# Patient Record
Sex: FEMALE | Race: WHITE | NOT HISPANIC OR LATINO | URBAN - METROPOLITAN AREA
[De-identification: names, ages, dates, MRNs, and addresses within clinical notes are randomized per-mention and may not be internally consistent; named-entity substitution may affect disease eponyms.]

---

## 2021-08-09 ENCOUNTER — OUTPATIENT (OUTPATIENT)
Dept: OUTPATIENT SERVICES | Facility: HOSPITAL | Age: 86
LOS: 1 days | Discharge: HOME | End: 2021-08-09
Payer: MEDICARE

## 2021-08-09 DIAGNOSIS — M79.604 PAIN IN RIGHT LEG: ICD-10-CM

## 2021-08-09 PROCEDURE — 93971 EXTREMITY STUDY: CPT | Mod: 26,RT

## 2022-03-19 ENCOUNTER — EMERGENCY (EMERGENCY)
Facility: HOSPITAL | Age: 87
LOS: 0 days | Discharge: HOME | End: 2022-03-19
Attending: STUDENT IN AN ORGANIZED HEALTH CARE EDUCATION/TRAINING PROGRAM
Payer: MEDICARE

## 2022-03-19 VITALS
SYSTOLIC BLOOD PRESSURE: 179 MMHG | HEIGHT: 61 IN | WEIGHT: 134.92 LBS | DIASTOLIC BLOOD PRESSURE: 79 MMHG | HEART RATE: 84 BPM | RESPIRATION RATE: 20 BRPM | OXYGEN SATURATION: 97 % | TEMPERATURE: 98 F

## 2022-03-19 DIAGNOSIS — M79.89 OTHER SPECIFIED SOFT TISSUE DISORDERS: ICD-10-CM

## 2022-03-19 DIAGNOSIS — Z86.51 PERSONAL HISTORY OF COMBAT AND OPERATIONAL STRESS REACTION: ICD-10-CM

## 2022-03-19 DIAGNOSIS — F41.9 ANXIETY DISORDER, UNSPECIFIED: ICD-10-CM

## 2022-03-19 DIAGNOSIS — M79.662 PAIN IN LEFT LOWER LEG: ICD-10-CM

## 2022-03-19 DIAGNOSIS — E78.5 HYPERLIPIDEMIA, UNSPECIFIED: ICD-10-CM

## 2022-03-19 PROCEDURE — 73590 X-RAY EXAM OF LOWER LEG: CPT | Mod: 26,LT

## 2022-03-19 PROCEDURE — 99284 EMERGENCY DEPT VISIT MOD MDM: CPT

## 2022-03-19 PROCEDURE — 93971 EXTREMITY STUDY: CPT | Mod: 26

## 2022-03-19 RX ORDER — ACETAMINOPHEN 500 MG
975 TABLET ORAL ONCE
Refills: 0 | Status: COMPLETED | OUTPATIENT
Start: 2022-03-19 | End: 2022-03-19

## 2022-03-19 RX ADMIN — Medication 975 MILLIGRAM(S): at 16:11

## 2022-03-19 NOTE — ED PROVIDER NOTE - NSFOLLOWUPINSTRUCTIONS_ED_ALL_ED_FT
Leg Edema    WHAT YOU NEED TO KNOW:    Leg edema is swelling caused by fluid buildup. Your legs may swell if you sit or stand for long periods of time, are pregnant, or are injured. Swelling may also occur if you have heart failure or circulation problems. This means that your heart does not pump blood through your body as it should.    Lower Leg Edema         DISCHARGE INSTRUCTIONS:    Call your local emergency number (911 in the [place]) for any of the following:   •You cannot walk.      •You have chest pain or trouble breathing that is worse when you lie down.      •You suddenly feel lightheaded and have trouble breathing.      •You have new and sudden chest pain. You may have more pain when you take deep breaths or cough.      •You cough up blood.      Return to the emergency department if:   •You feel faint or confused.      •Your skin turns blue or gray.      •Your leg feels warm, tender, and painful. It may be swollen and red.      Call your doctor if:   •You have a fever or feel more tired than usual.      •The veins in your legs look larger than usual. They may look full or bulging.      •Your legs itch or feel heavy.      •You have red or white areas or sores on your legs. The skin may also appear dimpled or have indentations.      •You are gaining weight.      •You have trouble moving your ankles.      •The swelling does not go away, or other parts of your body swell.      •You have questions or concerns about your condition or care.      Self-care:   •Elevate your legs. Raise your legs above the level of your heart as often as you can. This will help decrease swelling and pain. Prop your legs on pillows or blankets to keep them elevated comfortably.  Elevate Leg           •Wear pressure stockings, if directed. These tight stockings put pressure on your legs to promote blood flow and prevent blood clots. Put them on before you get out of bed. Wear the stockings during the day. Do not wear them while you sleep.  Pressure Stockings            •Stay active. Do not stand or sit for long periods of time. Ask your healthcare provider about the best exercise plan for you.  Walking for Exercise           •Eat healthy foods. Healthy foods include fruits, vegetables, whole-grain breads, low-fat dairy products, beans, lean meats, and fish. Ask if you need to be on a special diet.  Healthy Foods           •Limit sodium (salt). Salt will make your body hold even more fluid. Your healthcare provider will tell you how many milligrams (mg) of salt you can have each day.             Follow up with your doctor as directed: Write down your questions so you remember to ask them during your visits.

## 2022-03-19 NOTE — ED PROVIDER NOTE - PATIENT PORTAL LINK FT
You can access the FollowMyHealth Patient Portal offered by [facility] by registering at the following website: [email or web address]. By joining [employer]’s FollowMyHealth portal, you will also be able to view your health information using other applications (apps) compatible with our system.

## 2022-03-19 NOTE — ED PROVIDER NOTE - NS ED ROS FT
CONST: no fevers, no chills, no trauma  EYES: no pain, no visual disturbances  ENT: no sore throat, no epistaxis, no rhinorrhea, no hearing changes  CV: no chest pain, no palpitations, no orthopnea, no extremity pain or swelling  RESP: no shortness of breath, no cough, no sputum, no pleurisy, no wheezing  ABD: no abdominal pain, no nausea, no vomiting, no diarrhea, no black or bloody stool  : no dysuria, no hematuria, no frequency, no urgency  MSK: left leg swelling.  NEURO: no headache, no sensory disturbances, no focal weakness, no dizziness  HEME: no easy bleeding or bruising  SKIN: no diaphoresis, no rash

## 2022-03-19 NOTE — ED PROVIDER NOTE - OBJECTIVE STATEMENT
86y F wP of HLD presents to the ED for left lateral leg swelling/pain for x2 days. Denies falls/trauma/injury, no noted rashes, fever/chills, CP, abdominal pain, recent travel/sick contact or dysuria. Pt states swelling is worse at the end of the day and better in the morning.

## 2022-03-19 NOTE — ED ADULT TRIAGE NOTE - NS ED TRIAGE AVPU SCALE
Alert-The patient is alert, awake and responds to voice. The patient is oriented to time, place, and person. The triage nurse is able to obtain subjective information.
hair removal not indicated

## 2022-03-19 NOTE — ED PROVIDER NOTE - PHYSICAL EXAMINATION
VITALS: reviewed  GEN: NAD, A & O x 4  HEAD/EYES: NCAT, PERRL, EOMI, anicteric sclerae, no conjunctival pallor  ENT: mucus membranes moist, oropharynx WNL, trachea midline, no JVD  RESP: lungs CTA with equal breath sounds bilaterally, chest wall nontender and atraumatic  CV: heart with reg rhythm S1, S2, no murmur; distal pulses intact and symmetric bilaterally  ABDOMEN: normoactive bowel sounds, soft, nondistended, nontender, no palpable masses  : no CVAT  MSK: tenderness of left lateral leg, Cecy's sign is negative, FROM of knee.   SKIN: warm, dry, no rash, no bruising, no cyanosis. color appropriate for ethnicity  NEURO: alert, mentating appropriately, no facial asymmetry. gross sensation, motor, coordination are intact  PSYCH: Affect appropriate VITALS: reviewed  GEN: NAD, A & O x 4  HEAD/EYES: NCAT, PERRL, EOMI, anicteric sclerae,   ENT: mucus membranes moist, oropharynx WNL, trachea midline,  RESP: lungs CTA with equal breath sounds bilaterally, chest wall nontender and atraumatic  CV: heart with reg rhythm S1, S2, no murmur; distal pulses intact and symmetric bilaterally  ABDOMEN: normoactive bowel sounds, soft, nondistended, nontender, no palpable masses  : no CVAT  MSK: tenderness of left lateral leg, Cecy's sign is negative, FROM of knee.   SKIN: warm, dry, no rash, no bruising, no cyanosis. color appropriate for ethnicity  NEURO: alert, mentating appropriately, no facial asymmetry. gross sensation, motor, coordination are intact  PSYCH: Affect appropriate

## 2022-06-22 ENCOUNTER — OUTPATIENT (OUTPATIENT)
Dept: OUTPATIENT SERVICES | Facility: HOSPITAL | Age: 87
LOS: 1 days | Discharge: HOME | End: 2022-06-22
Payer: MEDICARE

## 2022-06-22 ENCOUNTER — APPOINTMENT (OUTPATIENT)
Dept: ULTRASOUND IMAGING | Facility: HOSPITAL | Age: 87
End: 2022-06-22

## 2022-06-22 DIAGNOSIS — E83.10 DISORDER OF IRON METABOLISM, UNSPECIFIED: ICD-10-CM

## 2022-06-22 DIAGNOSIS — E83.119 HEMOCHROMATOSIS, UNSPECIFIED: ICD-10-CM

## 2022-06-22 PROCEDURE — 76705 ECHO EXAM OF ABDOMEN: CPT | Mod: 26

## 2022-08-30 ENCOUNTER — INPATIENT (INPATIENT)
Facility: HOSPITAL | Age: 87
LOS: 10 days | Discharge: HOME | End: 2022-09-10
Attending: INTERNAL MEDICINE | Admitting: INTERNAL MEDICINE

## 2022-08-30 VITALS
HEART RATE: 99 BPM | HEIGHT: 61 IN | OXYGEN SATURATION: 98 % | DIASTOLIC BLOOD PRESSURE: 77 MMHG | TEMPERATURE: 98 F | WEIGHT: 130.95 LBS | SYSTOLIC BLOOD PRESSURE: 134 MMHG | RESPIRATION RATE: 17 BRPM

## 2022-08-30 DIAGNOSIS — D64.9 ANEMIA, UNSPECIFIED: ICD-10-CM

## 2022-08-30 DIAGNOSIS — F41.9 ANXIETY DISORDER, UNSPECIFIED: ICD-10-CM

## 2022-08-30 DIAGNOSIS — Z85.51 PERSONAL HISTORY OF MALIGNANT NEOPLASM OF BLADDER: ICD-10-CM

## 2022-08-30 DIAGNOSIS — K63.89 OTHER SPECIFIED DISEASES OF INTESTINE: ICD-10-CM

## 2022-08-30 LAB
ABO RH CONFIRMATION: SIGNIFICANT CHANGE UP
ALBUMIN SERPL ELPH-MCNC: 3.1 G/DL — LOW (ref 3.3–5)
ALP SERPL-CCNC: 66 U/L — SIGNIFICANT CHANGE UP (ref 40–120)
ALT FLD-CCNC: 14 U/L — SIGNIFICANT CHANGE UP (ref 12–78)
ANION GAP SERPL CALC-SCNC: 6 MMOL/L — SIGNIFICANT CHANGE UP (ref 5–17)
ANISOCYTOSIS BLD QL: SLIGHT — SIGNIFICANT CHANGE UP
AST SERPL-CCNC: 19 U/L — SIGNIFICANT CHANGE UP (ref 15–37)
BASOPHILS # BLD AUTO: 0.06 K/UL — SIGNIFICANT CHANGE UP (ref 0–0.2)
BASOPHILS NFR BLD AUTO: 1.3 % — SIGNIFICANT CHANGE UP (ref 0–2)
BILIRUB SERPL-MCNC: 0.3 MG/DL — SIGNIFICANT CHANGE UP (ref 0.2–1.2)
BLD GP AB SCN SERPL QL: SIGNIFICANT CHANGE UP
BUN SERPL-MCNC: 19 MG/DL — SIGNIFICANT CHANGE UP (ref 7–23)
CALCIUM SERPL-MCNC: 9.1 MG/DL — SIGNIFICANT CHANGE UP (ref 8.5–10.1)
CHLORIDE SERPL-SCNC: 108 MMOL/L — SIGNIFICANT CHANGE UP (ref 96–108)
CO2 SERPL-SCNC: 25 MMOL/L — SIGNIFICANT CHANGE UP (ref 22–31)
CREAT SERPL-MCNC: 0.91 MG/DL — SIGNIFICANT CHANGE UP (ref 0.5–1.3)
EGFR: 62 ML/MIN/1.73M2 — SIGNIFICANT CHANGE UP
EOSINOPHIL # BLD AUTO: 0.08 K/UL — SIGNIFICANT CHANGE UP (ref 0–0.5)
EOSINOPHIL NFR BLD AUTO: 1.7 % — SIGNIFICANT CHANGE UP (ref 0–6)
FLUAV AG NPH QL: SIGNIFICANT CHANGE UP
FLUBV AG NPH QL: SIGNIFICANT CHANGE UP
GLUCOSE SERPL-MCNC: 92 MG/DL — SIGNIFICANT CHANGE UP (ref 70–99)
HCT VFR BLD CALC: 22.6 % — LOW (ref 34.5–45)
HGB BLD-MCNC: 6.2 G/DL — CRITICAL LOW (ref 11.5–15.5)
HYPOCHROMIA BLD QL: SLIGHT — SIGNIFICANT CHANGE UP
IMM GRANULOCYTES NFR BLD AUTO: 0.2 % — SIGNIFICANT CHANGE UP (ref 0–1.5)
LYMPHOCYTES # BLD AUTO: 0.93 K/UL — LOW (ref 1–3.3)
LYMPHOCYTES # BLD AUTO: 19.4 % — SIGNIFICANT CHANGE UP (ref 13–44)
MACROCYTES BLD QL: SLIGHT — SIGNIFICANT CHANGE UP
MANUAL SMEAR VERIFICATION: SIGNIFICANT CHANGE UP
MCHC RBC-ENTMCNC: 18.9 PG — LOW (ref 27–34)
MCHC RBC-ENTMCNC: 27.4 G/DL — LOW (ref 32–36)
MCV RBC AUTO: 68.9 FL — LOW (ref 80–100)
MICROCYTES BLD QL: SLIGHT — SIGNIFICANT CHANGE UP
MONOCYTES # BLD AUTO: 0.33 K/UL — SIGNIFICANT CHANGE UP (ref 0–0.9)
MONOCYTES NFR BLD AUTO: 6.9 % — SIGNIFICANT CHANGE UP (ref 2–14)
NEUTROPHILS # BLD AUTO: 3.38 K/UL — SIGNIFICANT CHANGE UP (ref 1.8–7.4)
NEUTROPHILS NFR BLD AUTO: 70.5 % — SIGNIFICANT CHANGE UP (ref 43–77)
NRBC # BLD: 0 /100 WBCS — SIGNIFICANT CHANGE UP (ref 0–0)
OVALOCYTES BLD QL SMEAR: SLIGHT — SIGNIFICANT CHANGE UP
PLAT MORPH BLD: NORMAL — SIGNIFICANT CHANGE UP
PLATELET # BLD AUTO: 428 K/UL — HIGH (ref 150–400)
POIKILOCYTOSIS BLD QL AUTO: SLIGHT — SIGNIFICANT CHANGE UP
POTASSIUM SERPL-MCNC: 4.1 MMOL/L — SIGNIFICANT CHANGE UP (ref 3.5–5.3)
POTASSIUM SERPL-SCNC: 4.1 MMOL/L — SIGNIFICANT CHANGE UP (ref 3.5–5.3)
PROT SERPL-MCNC: 7.5 GM/DL — SIGNIFICANT CHANGE UP (ref 6–8.3)
RBC # BLD: 3.28 M/UL — LOW (ref 3.8–5.2)
RBC # FLD: 17.2 % — HIGH (ref 10.3–14.5)
RBC BLD AUTO: ABNORMAL
SARS-COV-2 RNA SPEC QL NAA+PROBE: SIGNIFICANT CHANGE UP
SODIUM SERPL-SCNC: 139 MMOL/L — SIGNIFICANT CHANGE UP (ref 135–145)
TSH SERPL-MCNC: 0.81 UU/ML — SIGNIFICANT CHANGE UP (ref 0.36–3.74)
WBC # BLD: 4.79 K/UL — SIGNIFICANT CHANGE UP (ref 3.8–10.5)
WBC # FLD AUTO: 4.79 K/UL — SIGNIFICANT CHANGE UP (ref 3.8–10.5)

## 2022-08-30 PROCEDURE — 88305 TISSUE EXAM BY PATHOLOGIST: CPT | Mod: 26

## 2022-08-30 PROCEDURE — 99285 EMERGENCY DEPT VISIT HI MDM: CPT

## 2022-08-30 PROCEDURE — 93010 ELECTROCARDIOGRAM REPORT: CPT

## 2022-08-30 PROCEDURE — 74177 CT ABD & PELVIS W/CONTRAST: CPT | Mod: 26

## 2022-08-30 RX ORDER — SODIUM CHLORIDE 9 MG/ML
1000 INJECTION INTRAMUSCULAR; INTRAVENOUS; SUBCUTANEOUS
Refills: 0 | Status: DISCONTINUED | OUTPATIENT
Start: 2022-08-30 | End: 2022-09-06

## 2022-08-30 RX ORDER — PANTOPRAZOLE SODIUM 20 MG/1
40 TABLET, DELAYED RELEASE ORAL
Refills: 0 | Status: DISCONTINUED | OUTPATIENT
Start: 2022-08-30 | End: 2022-09-06

## 2022-08-30 RX ORDER — HYDRALAZINE HCL 50 MG
10 TABLET ORAL EVERY 4 HOURS
Refills: 0 | Status: DISCONTINUED | OUTPATIENT
Start: 2022-08-30 | End: 2022-09-06

## 2022-08-30 RX ADMIN — Medication 10 MILLIGRAM(S): at 21:50

## 2022-08-30 NOTE — ED PROVIDER NOTE - CLINICAL SUMMARY MEDICAL DECISION MAKING FREE TEXT BOX
87yo female presenting with reported anemia outpatient.  Has fatigue but otherwise no cp, sob, abd pain.  HDS.  Will check labs here and likely transfuse, likely admit.

## 2022-08-30 NOTE — H&P ADULT - ASSESSMENT
IMPROVE VTE Individual Risk Assessment    RISK                                                                Points    [  ] Previous VTE                                                  3    [  ] Thrombophilia                                               2    [  ] Lower limb paralysis                                      2        (unable to hold up >15 seconds)      [  ] Current Cancer                                              2         (within 6 months)    [  x] Immobilization > 24 hrs                                1    [  ] ICU/CCU stay > 24 hours                              1    [  x] Age > 60                                                      1    IMPROVE VTE Score _____2____    IMPROVE Score 0-1: Low Risk, No VTE prophylaxis required for most patients, encourage ambulation.   IMPROVE Score 2-3: At risk, pharmacologic VTE prophylaxis is indicated for most patients (in the absence of a contraindication)  IMPROVE Score > or = 4: High Risk, pharmacologic VTE prophylaxis is indicated for most patients (in the absence of a contraindication)

## 2022-08-30 NOTE — CONSULT NOTE ADULT - SUBJECTIVE AND OBJECTIVE BOX
Chief Complaint:  Patient is a 86y old  Female who presents with a chief complaint of anemia  gamez (30 Aug 2022 14:32)      HPI:   85yo female with pmh hld presenting with low hemoglobin.  Has been feeling more fatigued lately and had blood work done with pmd and found to have hb 5.  Has been previous  anemia  improved  with  iron  supplementation  Denies dark or blood stools or other bleeding.  No ac no  nsads  use.  Has never had a colonoscopy.  Denies chest pain, sob, abdominal pain, n/v, fevers, urinary symptoms.   (30 Aug 2022 14:32)      PMH/PSH:PAST MEDICAL & SURGICAL HISTORY:  Anxiety  Dyslipidemia  Malignant neoplasm of bladder  S/P cystoscopy  History of dilatation and curettage    Allergies:  No Known Allergies      Medications:  pantoprazole    Tablet 40 milliGRAM(s) Oral before breakfast  sodium chloride 0.9%. 1000 milliLiter(s) IV Continuous <Continuous>      Review of Systems:    General:  No weight loss, fevers, chills, night sweats, fatigue,   Eyes:  Good vision, no reported pain  ENT:  No sore throat, pain, runny nose, dysphagia  CV:  No pain, palpitations, hypo/hypertension  Resp:  No dyspnea, cough, tachypnea, wheezing  GI:  No pain, No nausea, No vomiting, No diarrhea, No constipation, No pruritis, No rectal bleeding, No tarry stools, No dysphagia,  :  No pain, bleeding, incontinence, nocturia  Muscle:  No pain, weakness  Breast:  No pain, abscess, mass, discharge  Neuro:  No weakness, tingling, memory problems  Psych:  No fatigue, insomnia, mood problems, depression  Endocrine:  No polyuria, polydipsia, cold/heat intolerance  Heme:  No petechiae, ecchymosis, easy bruisability  Skin:  No rash, tattoos, scars, edema    Relevant Family History:   FAMILY HISTORY:      Relevant Social History: Alcohol ( -) , Tobacco ( -) , Illicit drugs (- )     Physical Exam:    Vital Signs:  Vital Signs Last 24 Hrs  T(C): 36.7 (30 Aug 2022 17:00), Max: 36.8 (30 Aug 2022 09:33)  T(F): 98 (30 Aug 2022 17:00), Max: 98.2 (30 Aug 2022 09:33)  HR: 94 (30 Aug 2022 17:00) (94 - 99)  BP: 171/72 (30 Aug 2022 17:00) (134/77 - 171/72)  BP(mean): --  RR: 17 (30 Aug 2022 17:00) (17 - 17)  SpO2: 99% (30 Aug 2022 17:00) (98% - 99%)    Parameters below as of 30 Aug 2022 17:00  Patient On (Oxygen Delivery Method): room air      Daily Height in cm: 154.94 (30 Aug 2022 09:33)    Daily     General:  Appears stated age, well-groomed, well-nourished, no distress  HEENT:  NC/AT,  conjunctivae clear and pink, no thyromegaly, nodules, adenopathy, no JVD, anicteric sclera  Chest:  Full & symmetric excursion, no increased effort, breath sounds clear  Cardiovascular:  Regular rhythm, S1, S2, no murmur/rub/S3/S4, no abdominal bruit, no edema  Abdomen:  Soft, + RLLQtender, non distended, normoactive bowel sounds,  no masses , no hepatosplenomegaly, no signs of chronic liver disease  Extremities:  no cyanosis, clubbing or edema  Skin:  No rash/erythema/ecchymoses/petechiae/wounds/abscess/warm/dry  Neuro/Psych:  Alert, oriented, no asterixis, no tremor, no encephalopathy    Laboratory:                          6.2    4.79  )-----------( 428      ( 30 Aug 2022 12:02 )             22.6     08-30    139  |  108  |  19  ----------------------------<  92  4.1   |  25  |  0.91    Ca    9.1      30 Aug 2022 12:02    TPro  7.5  /  Alb  3.1<L>  /  TBili  0.3  /  DBili  x   /  AST  19  /  ALT  14  /  AlkPhos  66  08-30    LIVER FUNCTIONS - ( 30 Aug 2022 12:02 )  Alb: 3.1 g/dL / Pro: 7.5 gm/dL / ALK PHOS: 66 U/L / ALT: 14 U/L / AST: 19 U/L / GGT: x           Imaging:    < from: CT Abdomen and Pelvis w/ IV Cont (08.30.22 @ 15:17) >    ACC: [id number] EXAM:  CT ABDOMEN AND PELVIS IC                          PROCEDURE DATE:  08/30/2022          INTERPRETATION:  CLINICAL INFORMATION: Symptomatic anemia.    COMPARISON: None.    CONTRAST/COMPLICATIONS:  IV Contrast: Omnipaque 350  90 cc administered   10 cc discarded  Oral Contrast: NONE  Complications: None reported at time of study completion    PROCEDURE:  CT of the Abdomen and Pelvis was performed.  Sagittal and coronal reformats were performed.    FINDINGS:  LOWER CHEST: Within normal limits.    LIVER: Subcentimeter lesions too small to characterize, likely cysts.  BILE DUCTS: Upper limits of caliber for the patient's age. Abrupt   termination of the distal CBD at the level of the ampulla (4:45) which   may reflect the presence of intraluminal debris or mass.  GALLBLADDER: Within normal limits.  SPLEEN: Within normal limits.  PANCREAS: Within normal limits.  ADRENALS: Within normal limits.  KIDNEYS/URETERS: Within normal limits.    BLADDER: Within normal limits.  REPRODUCTIVE ORGANS: Calcified myoma.    BOWEL: No bowel obstruction. Small periampullary duodenal diverticulum.   Appendix is dilated and fluid-filled. Wall thickening of the colon   extending from the cecum through the proximal ascending colon, concerning   for malignancy. Mass appears to extend across to the proximal transverse   colon, raising a question of colon, fistula.  PERITONEUM: No ascites.  VESSELS: Atherosclerotic calcifications.  RETROPERITONEUM/LYMPH NODES: No lymphadenopathy.  ABDOMINALWALL: Within normal limits.  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    Fluid distention of the appendix secondary colonic mass obstructing   appendiceal orifice.    Subcentimeter liver lesions too small to characterize, likely cysts.    Abrupt termination of the CBD, possibly related to intraluminal debris or   mass. Consider further evaluation with MRI/MRCP.        GALDINO LOPEZ MD; Attending Radiologist  This document has been e    < end of copied text >

## 2022-08-30 NOTE — H&P ADULT - HISTORY OF PRESENT ILLNESS
85yo female with pmh hld presenting with low hemoglobin.  Has been feeling more fatigued lately and had blood work done with pmd and found to have hb 5.  Has been previous  anemia  improved  with  iron  supplementation  Denies dark or blood stools or other bleeding.  No ac no  nsads  use.  Has never had a colonoscopy.  Denies chest pain, sob, abdominal pain, n/v, fevers, urinary symptoms.

## 2022-08-30 NOTE — ED PROVIDER NOTE - PHYSICAL EXAMINATION
General appearance: Nontoxic appearing, conversant, afebrile    Eyes: anicteric sclerae, DOTTY, EOMI   HENT: Atraumatic; oropharynx clear, MMM and no ulcerations, no pharyngeal erythema or exudate   Neck: Trachea midline; Full range of motion, supple   Pulm: CTA bl, normal respiratory effort and no intercostal retractions, normal work of breathing   CV: RRR, No murmurs, rubs, or gallops   Abdomen: Soft, non-tender, non-distended; no guarding or rebound   Extremities: No peripheral edema, no gross deformities, FROM x4   Skin: Dry, normal temperature, turgor and texture; no rash, pale appearing   Psych: Appropriate affect, cooperative

## 2022-08-30 NOTE — ED PROVIDER NOTE - OBJECTIVE STATEMENT
85yo female with pmh hld presenting with low hemoglobin.  Has been feeling more fatigued lately and had blood work done with pmd and found to have hb 5.  Has been previously anemic but unknown why per patient.  Denies dark or blood stools or other bleeding.  Was started on iron supplements which have not been helping.  No ac use.  Has never had a colonoscopy.  Denies chest pain, sob, abdominal pain, n/v, fevers, urinary symptoms.

## 2022-08-30 NOTE — ED ADULT NURSE REASSESSMENT NOTE - NS ED NURSE REASSESS COMMENT FT1
Blood transfusion started, IV patent, Consent form in chart. All transfusion precautions taken . pt educated on possible transfusion reaction, pt verbalized understanding. Dual RN verifications. will reassess in 15 minutes

## 2022-08-30 NOTE — ED ADULT NURSE NOTE - OBJECTIVE STATEMENT
pt presents to ed a&ox4 breathing spont/unlabored to ra sent by pcp for abnormal labs . pt c/o feeling weak and fatigued . Denies dark stools , no bleeding reported. hx hld

## 2022-08-30 NOTE — ED ADULT TRIAGE NOTE - CHIEF COMPLAINT QUOTE
Patient sent by MD for abnormal lab result. Patient states provider mentioned hgb is 5. Patient does c/o fatigue denies CP, SOB, or dizziness.  pmh HLD

## 2022-08-30 NOTE — H&P ADULT - NSHPLABSRESULTS_GEN_ALL_CORE
LABS:                        6.2    4.79  )-----------( 428      ( 30 Aug 2022 12:02 )             22.6     08-30    139  |  108  |  19  ----------------------------<  92  4.1   |  25  |  0.91    Ca    9.1      30 Aug 2022 12:02    TPro  7.5  /  Alb  3.1<L>  /  TBili  0.3  /  DBili  x   /  AST  19  /  ALT  14  /  AlkPhos  66  08-30

## 2022-08-31 LAB
ALBUMIN SERPL ELPH-MCNC: 2.9 G/DL — LOW (ref 3.3–5)
ALP SERPL-CCNC: 64 U/L — SIGNIFICANT CHANGE UP (ref 40–120)
ALT FLD-CCNC: 15 U/L — SIGNIFICANT CHANGE UP (ref 12–78)
ANION GAP SERPL CALC-SCNC: 5 MMOL/L — SIGNIFICANT CHANGE UP (ref 5–17)
AST SERPL-CCNC: 18 U/L — SIGNIFICANT CHANGE UP (ref 15–37)
BILIRUB SERPL-MCNC: 0.5 MG/DL — SIGNIFICANT CHANGE UP (ref 0.2–1.2)
BUN SERPL-MCNC: 14 MG/DL — SIGNIFICANT CHANGE UP (ref 7–23)
CALCIUM SERPL-MCNC: 8.6 MG/DL — SIGNIFICANT CHANGE UP (ref 8.5–10.1)
CEA SERPL-MCNC: 10.4 NG/ML — HIGH (ref 0–3.8)
CHLORIDE SERPL-SCNC: 107 MMOL/L — SIGNIFICANT CHANGE UP (ref 96–108)
CO2 SERPL-SCNC: 28 MMOL/L — SIGNIFICANT CHANGE UP (ref 22–31)
CREAT SERPL-MCNC: 0.73 MG/DL — SIGNIFICANT CHANGE UP (ref 0.5–1.3)
EGFR: 81 ML/MIN/1.73M2 — SIGNIFICANT CHANGE UP
FOLATE SERPL-MCNC: >20 NG/ML — SIGNIFICANT CHANGE UP
GLUCOSE SERPL-MCNC: 96 MG/DL — SIGNIFICANT CHANGE UP (ref 70–99)
HCT VFR BLD CALC: 30.2 % — LOW (ref 34.5–45)
HGB BLD-MCNC: 9.3 G/DL — LOW (ref 11.5–15.5)
IRON SATN MFR SERPL: 38 UG/DL — SIGNIFICANT CHANGE UP (ref 30–160)
IRON SATN MFR SERPL: 8 % — LOW (ref 14–50)
MCHC RBC-ENTMCNC: 22.6 PG — LOW (ref 27–34)
MCHC RBC-ENTMCNC: 30.8 G/DL — LOW (ref 32–36)
MCV RBC AUTO: 73.3 FL — LOW (ref 80–100)
NRBC # BLD: 0 /100 WBCS — SIGNIFICANT CHANGE UP (ref 0–0)
PLATELET # BLD AUTO: 367 K/UL — SIGNIFICANT CHANGE UP (ref 150–400)
POTASSIUM SERPL-MCNC: 4 MMOL/L — SIGNIFICANT CHANGE UP (ref 3.5–5.3)
POTASSIUM SERPL-SCNC: 4 MMOL/L — SIGNIFICANT CHANGE UP (ref 3.5–5.3)
PROT SERPL-MCNC: 7.1 GM/DL — SIGNIFICANT CHANGE UP (ref 6–8.3)
RBC # BLD: 4.12 M/UL — SIGNIFICANT CHANGE UP (ref 3.8–5.2)
RBC # FLD: 21.7 % — HIGH (ref 10.3–14.5)
SODIUM SERPL-SCNC: 140 MMOL/L — SIGNIFICANT CHANGE UP (ref 135–145)
T3 SERPL-MCNC: 123 NG/DL — SIGNIFICANT CHANGE UP (ref 80–200)
T4 AB SER-ACNC: 8.2 UG/DL — SIGNIFICANT CHANGE UP (ref 4.6–12)
TIBC SERPL-MCNC: 479 UG/DL — HIGH (ref 220–430)
UIBC SERPL-MCNC: 441 UG/DL — HIGH (ref 110–370)
VIT B12 SERPL-MCNC: 360 PG/ML — SIGNIFICANT CHANGE UP (ref 232–1245)
WBC # BLD: 5.49 K/UL — SIGNIFICANT CHANGE UP (ref 3.8–10.5)
WBC # FLD AUTO: 5.49 K/UL — SIGNIFICANT CHANGE UP (ref 3.8–10.5)

## 2022-08-31 RX ORDER — SOD SULF/SODIUM/NAHCO3/KCL/PEG
4000 SOLUTION, RECONSTITUTED, ORAL ORAL ONCE
Refills: 0 | Status: COMPLETED | OUTPATIENT
Start: 2022-08-31 | End: 2022-08-31

## 2022-08-31 RX ADMIN — Medication 4000 MILLILITER(S): at 18:27

## 2022-08-31 RX ADMIN — PANTOPRAZOLE SODIUM 40 MILLIGRAM(S): 20 TABLET, DELAYED RELEASE ORAL at 08:38

## 2022-08-31 RX ADMIN — Medication 10 MILLIGRAM(S): at 16:30

## 2022-08-31 NOTE — PATIENT PROFILE ADULT - STATED REASON FOR ADMISSION
Pt appears euvolemic on exam  -Chest xray: clear lungs  2G Sodium 1800 ADA diet  continue metoprolol.  Holding Lisinopril and torsemide for CJ, orthostasis. Anemia

## 2022-08-31 NOTE — PATIENT PROFILE ADULT - FUNCTIONAL ASSESSMENT - BASIC MOBILITY 1.
Preprocedure H&P    No new cardiopulmonary symptoms since last PCP visit    Past Medical History:   Diagnosis Date   â¢ Acute on chronic heart failure (CMS/HCC)    â¢ Acute respiratory failure with hypoxia (CMS/HCC) 07/02/2019   â¢ Alcohol abuse     Sober since November 2017; prior excessive beer and occasional whiskey   â¢ Anasarca 07/02/2019   â¢ Aortic stenosis    â¢ Asthma    â¢ Blood clot associated with vein wall inflammation    â¢ Chronic kidney disease     Possibly related to chronic liver disease   â¢ Chronic kidney disease, stage V requiring chronic dialysis (CMS/HCC) 10/24/2019   â¢ Hepatitis C 07/2019    Believes related to 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    â¢ Liver disease    â¢ Pneumonia due to COVID-19 virus 09/25/2020   â¢ Pneumonitis 07/02/2019   â¢ S/P AVR 6/23/2021   â¢ S/P left atrial appendage ligation 6/23/2021   â¢ S/P Maze operation for atrial fibrillation 6/23/2021   â¢ Wears glasses      Past Surgical History:   Procedure Laterality Date   â¢ Aortic valve replacement  06/23/2021   â¢ Appendectomy     â¢ Cath place for coronary angiography w md sup - interp graft & rt heart  06/02/2021   â¢ Egd  05/13/2022    with evbl   â¢ Eye surgery      left eye; stribismus   â¢ Tonsillectomy       Current Facility-Administered Medications   Medication Dose Route Frequency Provider Last Rate Last Admin   â¢ sodium chloride 0.9 % flush bag 25 mL  25 mL Intravenous PRN Kalen Hernandez, DO       â¢ sodium chloride (PF) 0.9 % injection 2 mL  2 mL Intracatheter 2 times per day Kalen Hernandez, DO       â¢ sodium chloride 0.9% infusion   Intravenous Continuous Kalen Hernandez DO       â¢ lidocaine-sodium bicarbonate 0.9-8.4% for J-tip administration 0.5-1 mL  0.5-1 mL Subcutaneous PRN Liane Silver MD        Or   â¢ lidocaine 1 % injection 5-10 mg  5-10 mg Subcutaneous PRN Liane Silver MD       â¢ metoPROLOL tartrate (LOPRESSOR) tablet 25 mg  25 mg Oral Once "PRN Angelina Arellano MD       â¢ MIDAZolam (VERSED) injection 2 mg  2 mg Intravenous Once PRN Angelina Arellano MD       â¢ sodium chloride 0.9 % flush bag 25 mL  25 mL Intravenous PRN Angelina Arellano MD       â¢ lactated ringers infusion   Intravenous Continuous Angelina Arellano MD 10 mL/hr at 05/13/22 0919 New Bag at 05/13/22 0919     ALLERGIES:  No Known Allergies  Blood pressure 115/58, pulse 77, temperature 97.6 Â°F (36.4 Â°C), temperature source Temporal, resp. rate 18, height 5' 10"" (1.778 m), weight 75.3 kg (166 lb), SpO2 100 %.     No acute distress lungs clear heart regular abdomen is soft and nontender      Stable for mac sedation EGD  " 4 = No assist / stand by assistance

## 2022-08-31 NOTE — PATIENT PROFILE ADULT - FALL HARM RISK - HARM RISK INTERVENTIONS

## 2022-08-31 NOTE — PROGRESS NOTE ADULT - SUBJECTIVE AND OBJECTIVE BOX
INTERVAL HPI/OVERNIGHT EVENTS:    s/p  transfusion  2 uprbcs    REVIEW OF SYSTEMS:  CONSTITUTIONAL:  no  complaints    NECK: No pain or stiffnes  RESPIRATORY: No SOB   CARDIOVASCULAR: No chest pain, palpitations, dizziness,   GASTROINTESTINAL: No abdominal pain. No nausea, vomiting,   NEUROLOGICAL: No headaches, no  blurry  vision no  dizziness  SKIN: No itching,   MUSCULOSKELETAL: No pain    MEDICATION:  hydrALAZINE Injectable 10 milliGRAM(s) IV Push every 4 hours PRN  pantoprazole    Tablet 40 milliGRAM(s) Oral before breakfast  sodium chloride 0.9%. 1000 milliLiter(s) IV Continuous <Continuous>    Vital Signs Last 24 Hrs  T(C): 36.1 (31 Aug 2022 04:07), Max: 36.9 (30 Aug 2022 17:15)  T(F): 97 (31 Aug 2022 04:07), Max: 98.5 (30 Aug 2022 17:15)  HR: 87 (31 Aug 2022 04:07) (82 - 99)  BP: 153/82 (31 Aug 2022 04:07) (134/77 - 172/84)  BP(mean): --  RR: 18 (31 Aug 2022 03:55) (14 - 18)  SpO2: 98% (31 Aug 2022 03:55) (95% - 99%)    Parameters below as of 30 Aug 2022 20:06  Patient On (Oxygen Delivery Method): room air        PHYSICAL EXAM:  GENERAL: NAD, well-groomed, well-developed  HEENT : Conjuntivae  clear sclerae anicteric  NECK: Supple, No JVD, Normal thyroid  NERVOUS SYSTEM:  Alert oriented   no  focal  deficits;   CHEST/LUNG: Clear    HEART: Regular rate and rhythm; No murmurs, rubs, or gallops  ABDOMEN: Soft, Nontender, Nondistended; Bowel sounds present  EXTREMITIES:  no  edema no  tenderness  SKIN: No rashes   LABS:                        9.3    5.49  )-----------( 367      ( 31 Aug 2022 08:01 )             30.2     08-31    140  |  107  |  14  ----------------------------<  96  4.0   |  28  |  0.73    Ca    8.6      31 Aug 2022 08:01    TPro  7.1  /  Alb  2.9<L>  /  TBili  0.5  /  DBili  x   /  AST  x   /  ALT  x   /  AlkPhos  64  08-31    < from: CT Abdomen and Pelvis w/ IV Cont (08.30.22 @ 15:17) >    IMPRESSION:  Irregular wall thickening of colon extending from the cecum through the   proximal ascending colon and across to the proximal transversecolon,   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    Fluid distention of the appendix secondary colonic mass obstructing   appendiceal orifice.    Subcentimeter liver lesions too small to characterize, likely cysts.    Abrupt termination of the CBD, possibly related to intraluminal debris or   mass. Consider further evaluation with MRI/MRCP.        < end of copied text >            RADIOLOGY & ADDITIONAL TESTS:    Imaging reports  Personally Reviewed:  [ ] YES  [ ] NO    Consultant(s) Notes Reviewed:  [ x] YES  [ ] NO    Care Discussed with Consultants/Other Providers [x ] YES  [ ] NO  Problem/Plan - 1:  ·  Problem: Anemia. colon  mass  ·  Plan: transfuse 2 uprbcs  check  iron studies  b12  folate  ct  abd pelvis  GI  evaluation.appreciated    Problem/Plan - 2:  ·  Problem: Anxiety.   ·  Plan: xanx prn.    Problem/Plan - 3:  ·  Problem: History of bladder cancer.   ·  Plan: u/a  observation.

## 2022-08-31 NOTE — PROGRESS NOTE ADULT - SUBJECTIVE AND OBJECTIVE BOX
Gastroenterology  Very anxious about potential diagnosis   Wants to go home but agreeable to colonoscopy   Denies any symptoms    T(F): 98 (08-31-22 @ 16:41), Max: 98.2 (08-31-22 @ 12:07)  HR: 100 (08-31-22 @ 18:54) (87 - 100)  BP: 130/72 (08-31-22 @ 18:54) (130/72 - 180/71)  RR: 18 (08-31-22 @ 16:41) (17 - 18)  SpO2: 98% (08-31-22 @ 16:41) (97% - 98%)  Wt(kg): --  CAPILLARY BLOOD GLUCOSE          PHYSICAL EXAM:  General: NAD, WDWN.   Neuro:  Alert & responsive  HEENT: NCAT, EOMI, conjunctiva clear  CV: +S1+S2 regular rate and rhythm  Lung: clear to ausculation bilaterally, respirations nonlabored, good inspiratory effort  Abdomen: soft, Non Tender, No distention Normal active BS  Extremities: no cyanosis, clubbing or edema    LABS:                        9.3    5.49  )-----------( 367      ( 31 Aug 2022 08:01 )             30.2     08-31    140  |  107  |  14  ----------------------------<  96  4.0   |  28  |  0.73    Ca    8.6      31 Aug 2022 08:01    TPro  7.1  /  Alb  2.9<L>  /  TBili  0.5  /  DBili  x   /  AST  18  /  ALT  15  /  AlkPhos  64  08-31    LIVER FUNCTIONS - ( 31 Aug 2022 08:01 )  Alb: 2.9 g/dL / Pro: 7.1 gm/dL / ALK PHOS: 64 U/L / ALT: 15 U/L / AST: 18 U/L / GGT: x             I&O's Detail    30 Aug 2022 07:01  -  31 Aug 2022 07:00  --------------------------------------------------------  IN:    PRBCs (Packed Red Blood Cells): 300 mL  Total IN: 300 mL    OUT:  Total OUT: 0 mL    Total NET: 300 mL      31 Aug 2022 07:01  -  31 Aug 2022 20:08  --------------------------------------------------------  IN:    Oral Fluid: 480 mL  Total IN: 480 mL    OUT:  Total OUT: 0 mL    Total NET: 480 mL        08-31 @ 08:01    140 | 107 | 14  /8.6 | -- | --  _______________________/  4.0 | 28 | 0.73                           \par   Iron Total, Serum: 38 ug/dL (08-31 @ 08:01)

## 2022-09-01 ENCOUNTER — RESULT REVIEW (OUTPATIENT)
Age: 87
End: 2022-09-01

## 2022-09-01 LAB
ALBUMIN SERPL ELPH-MCNC: 2.7 G/DL — LOW (ref 3.3–5)
ALP SERPL-CCNC: 57 U/L — SIGNIFICANT CHANGE UP (ref 40–120)
ALT FLD-CCNC: 13 U/L — SIGNIFICANT CHANGE UP (ref 12–78)
ANION GAP SERPL CALC-SCNC: 5 MMOL/L — SIGNIFICANT CHANGE UP (ref 5–17)
AST SERPL-CCNC: 16 U/L — SIGNIFICANT CHANGE UP (ref 15–37)
BILIRUB SERPL-MCNC: 0.5 MG/DL — SIGNIFICANT CHANGE UP (ref 0.2–1.2)
BUN SERPL-MCNC: 12 MG/DL — SIGNIFICANT CHANGE UP (ref 7–23)
CALCIUM SERPL-MCNC: 8.3 MG/DL — LOW (ref 8.5–10.1)
CHLORIDE SERPL-SCNC: 106 MMOL/L — SIGNIFICANT CHANGE UP (ref 96–108)
CO2 SERPL-SCNC: 28 MMOL/L — SIGNIFICANT CHANGE UP (ref 22–31)
CREAT SERPL-MCNC: 0.76 MG/DL — SIGNIFICANT CHANGE UP (ref 0.5–1.3)
EGFR: 77 ML/MIN/1.73M2 — SIGNIFICANT CHANGE UP
GLUCOSE SERPL-MCNC: 88 MG/DL — SIGNIFICANT CHANGE UP (ref 70–99)
HCT VFR BLD CALC: 28.4 % — LOW (ref 34.5–45)
HGB BLD-MCNC: 8.8 G/DL — LOW (ref 11.5–15.5)
MCHC RBC-ENTMCNC: 22.4 PG — LOW (ref 27–34)
MCHC RBC-ENTMCNC: 31 G/DL — LOW (ref 32–36)
MCV RBC AUTO: 72.4 FL — LOW (ref 80–100)
NRBC # BLD: 0 /100 WBCS — SIGNIFICANT CHANGE UP (ref 0–0)
PLATELET # BLD AUTO: 386 K/UL — SIGNIFICANT CHANGE UP (ref 150–400)
POTASSIUM SERPL-MCNC: 3.4 MMOL/L — LOW (ref 3.5–5.3)
POTASSIUM SERPL-SCNC: 3.4 MMOL/L — LOW (ref 3.5–5.3)
PROT SERPL-MCNC: 6.6 GM/DL — SIGNIFICANT CHANGE UP (ref 6–8.3)
RBC # BLD: 3.92 M/UL — SIGNIFICANT CHANGE UP (ref 3.8–5.2)
RBC # FLD: 22.1 % — HIGH (ref 10.3–14.5)
SODIUM SERPL-SCNC: 139 MMOL/L — SIGNIFICANT CHANGE UP (ref 135–145)
WBC # BLD: 4.54 K/UL — SIGNIFICANT CHANGE UP (ref 3.8–10.5)
WBC # FLD AUTO: 4.54 K/UL — SIGNIFICANT CHANGE UP (ref 3.8–10.5)

## 2022-09-01 PROCEDURE — 99223 1ST HOSP IP/OBS HIGH 75: CPT | Mod: 57

## 2022-09-01 RX ORDER — SODIUM CHLORIDE 9 MG/ML
1000 INJECTION, SOLUTION INTRAVENOUS
Refills: 0 | Status: DISCONTINUED | OUTPATIENT
Start: 2022-09-01 | End: 2022-09-01

## 2022-09-01 RX ORDER — ACETAMINOPHEN 500 MG
650 TABLET ORAL EVERY 6 HOURS
Refills: 0 | Status: DISCONTINUED | OUTPATIENT
Start: 2022-09-01 | End: 2022-09-06

## 2022-09-01 RX ORDER — POTASSIUM CHLORIDE 20 MEQ
10 PACKET (EA) ORAL ONCE
Refills: 0 | Status: COMPLETED | OUTPATIENT
Start: 2022-09-01 | End: 2022-09-01

## 2022-09-01 RX ORDER — POTASSIUM CHLORIDE 20 MEQ
20 PACKET (EA) ORAL ONCE
Refills: 0 | Status: COMPLETED | OUTPATIENT
Start: 2022-09-01 | End: 2022-09-01

## 2022-09-01 RX ADMIN — SODIUM CHLORIDE 75 MILLILITER(S): 9 INJECTION INTRAMUSCULAR; INTRAVENOUS; SUBCUTANEOUS at 06:19

## 2022-09-01 RX ADMIN — Medication 30 MILLILITER(S): at 21:48

## 2022-09-01 RX ADMIN — PANTOPRAZOLE SODIUM 40 MILLIGRAM(S): 20 TABLET, DELAYED RELEASE ORAL at 07:26

## 2022-09-01 RX ADMIN — Medication 20 MILLIEQUIVALENT(S): at 16:49

## 2022-09-01 RX ADMIN — Medication 10 MILLIGRAM(S): at 12:14

## 2022-09-01 NOTE — CONSULT NOTE ADULT - SUBJECTIVE AND OBJECTIVE BOX
HPI:  87yo female with pmh hld presenting with low hemoglobin.  Has been feeling more fatigued lately and had blood work done with pmd and found to have hb 5.  Has been previous  anemia  improved  with  iron  supplementation  Denies dark or blood stools or other bleeding.  No ac no  nsads  use.  Has never had a colonoscopy.  Denies chest pain, sob, abdominal pain, n/v, fevers, urinary symptoms. (30 Aug 2022 14:32)    Patient seen and examined at bedside resting. She states she feels upset over her possible diagnosis and says it's a lot to think about at once, but ultimately she believes she would want treatment.   She admits to previously having bladder Ca and underwent resection with Dr. Fischer and chemo/radiation about 9 years ago with no other problems since. Last BM/flatus at midnight. Denies any fever, chills, N/V/D, CP, SOB, constipation, abdominal pain.   Has never had a colonoscopy before. Denies any family history of cancer.       PAST MEDICAL & SURGICAL HISTORY:  Anxiety      Dyslipidemia      Malignant neoplasm of bladder      S/P cystoscopy      History of dilatation and curettage          Review of Systems:  contained within HPI    MEDICATIONS  (STANDING):  pantoprazole    Tablet 40 milliGRAM(s) Oral before breakfast  sodium chloride 0.9%. 1000 milliLiter(s) (75 mL/Hr) IV Continuous <Continuous>    MEDICATIONS  (PRN):  hydrALAZINE Injectable 10 milliGRAM(s) IV Push every 4 hours PRN htn      Allergies    No Known Allergies    Intolerances        SOCIAL HISTORY          Smoking: Yes [ ]  No [X]   ______pk yrs          ETOH  Yes [ ]  No [X]  Social [ ]          DRUGS:  Yes [ ]  No [X]  if so what______________    FAMILY HISTORY:  Denies any known family history of cancer    Vital Signs Last 24 Hrs  T(C): 36.7 (01 Sep 2022 05:22), Max: 36.8 (31 Aug 2022 12:07)  T(F): 98.1 (01 Sep 2022 05:22), Max: 98.2 (31 Aug 2022 12:07)  HR: 93 (01 Sep 2022 05:22) (88 - 100)  BP: 163/81 (01 Sep 2022 05:22) (130/72 - 180/71)  BP(mean): --  RR: 18 (01 Sep 2022 05:22) (17 - 18)  SpO2: 96% (01 Sep 2022 05:22) (96% - 98%)    Parameters below as of 01 Sep 2022 05:22  Patient On (Oxygen Delivery Method): room air    Physical Exam:  General:  Appears stated age, well-groomed, well-nourished, no distress  Eyes: WIHT  HENT: WNL, no JVD  Chest: clear breath sounds  Cardiovascular: Regular rate & rhythm  Abdomen: soft, non tender, non distended. No rebound, no guarding  Extremities: non edematous extremities  Skin: No rashes or lesions present  Musculoskeletal: normal strength  Neuro/Psych: AAO x3    LABS:                        8.8    4.54  )-----------( 386      ( 01 Sep 2022 08:52 )             28.4     08-31    140  |  107  |  14  ----------------------------<  96  4.0   |  28  |  0.73    Ca    8.6      31 Aug 2022 08:01    TPro  7.1  /  Alb  2.9<L>  /  TBili  0.5  /  DBili  x   /  AST  18  /  ALT  15  /  AlkPhos  64  08-31    RADIOLOGY & ADDITIONAL STUDIES:  < from: CT Abdomen and Pelvis w/ IV Cont (08.30.22 @ 15:17) >  ACC: [id number] EXAM:  CT ABDOMEN AND PELVIS IC                          PROCEDURE DATE:  08/30/2022          INTERPRETATION:  CLINICAL INFORMATION: Symptomatic anemia.    COMPARISON: None.    CONTRAST/COMPLICATIONS:  IV Contrast: Omnipaque 350  90 cc administered   10 cc discarded  Oral Contrast: NONE  Complications: None reported at time of study completion    PROCEDURE:  CT of the Abdomen and Pelvis was performed.  Sagittal and coronal reformats were performed.    FINDINGS:  LOWER CHEST: Within normal limits.    LIVER: Subcentimeter lesions too small to characterize, likely cysts.  BILE DUCTS: Upper limits of caliber for the patient's age. Abrupt   termination of the distal CBD at the level of the ampulla (4:45) which   may reflect the presence of intraluminal debris or mass.  GALLBLADDER: Within normal limits.  SPLEEN: Within normal limits.  PANCREAS: Within normal limits.  ADRENALS: Within normal limits.  KIDNEYS/URETERS: Within normal limits.    BLADDER: Within normal limits.  REPRODUCTIVE ORGANS: Calcified myoma.    BOWEL: No bowel obstruction. Small periampullary duodenal diverticulum.   Appendix is dilated and fluid-filled. Wall thickening of the colon   extending from the cecum through the proximal ascending colon, concerning   for malignancy. Mass appears to extend across to the proximal transverse   colon, raising a question of colon, fistula.  PERITONEUM: No ascites.  VESSELS: Atherosclerotic calcifications.  RETROPERITONEUM/LYMPH NODES: No lymphadenopathy.  ABDOMINALWALL: Within normal limits.  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    Fluid distention of the appendix secondary colonic mass obstructing   appendiceal orifice.    Subcentimeter liver lesions too small to characterize, likely cysts.    Abrupt termination of the CBD, possibly related to intraluminal debris or   mass. Consider further evaluation with MRI/MRCP.    A/P  87yo female with PMH of HLD and bladder Ca s/p resection and chemo/radiation who presented with low hemoglobin requiring transfusions. Found with irregular wall thickening of colon suspicious for malignancy and elevated CEA.     - colonoscopy to be done today, f/u results  - trend H/H   - [facility] CT chest  - consider oncology consult   - to be evaluated by Dr. Lu later today  - d/w Dr. Lu

## 2022-09-01 NOTE — PROGRESS NOTE ADULT - SUBJECTIVE AND OBJECTIVE BOX
Gastroenterology  Patient seen and examined bedside resting comfortably.  No complaints offered. tolerated prep well  No abdominal pain  Denies nausea and vomiting. NPO  Normal flatus/BM.     T(F): 97.7 (09-01-22 @ 14:22), Max: 98.4 (09-01-22 @ 12:10)  HR: 97 (09-01-22 @ 14:22) (88 - 100)  BP: 115/67 (09-01-22 @ 14:22) (115/67 - 180/71)  RR: 17 (09-01-22 @ 14:22) (17 - 18)  SpO2: 96% (09-01-22 @ 14:22) (96% - 98%)  Wt(kg): --  CAPILLARY BLOOD GLUCOSE          PHYSICAL EXAM:  General: NAD, WDWN.   Neuro:  Alert & responsive  HEENT: NCAT, EOMI, conjunctiva clear  CV: +S1+S2 regular rate and rhythm  Lung: clear to ausculation bilaterally, respirations nonlabored, good inspiratory effort  Abdomen: soft, + r lq Tender, No distention Normal active BS  Extremities: no cyanosis, clubbing or edema    LABS:                        8.8    4.54  )-----------( 386      ( 01 Sep 2022 08:52 )             28.4     09-01    139  |  106  |  12  ----------------------------<  88  3.4<L>   |  28  |  0.76    Ca    8.3<L>      01 Sep 2022 08:52    TPro  6.6  /  Alb  2.7<L>  /  TBili  0.5  /  DBili  x   /  AST  16  /  ALT  13  /  AlkPhos  57  09-01    LIVER FUNCTIONS - ( 01 Sep 2022 08:52 )  Alb: 2.7 g/dL / Pro: 6.6 gm/dL / ALK PHOS: 57 U/L / ALT: 13 U/L / AST: 16 U/L / GGT: x             I&O's Detail    31 Aug 2022 07:01  -  01 Sep 2022 07:00  --------------------------------------------------------  IN:    Oral Fluid: 480 mL  Total IN: 480 mL    OUT:  Total OUT: 0 mL    Total NET: 480 mL        09-01 @ 08:52    139 | 106 | 12  /8.3 | -- | --  _______________________/  3.4 | 28 | 0.76                           \par   Iron Total, Serum: 38 ug/dL (08-31 @ 08:01)

## 2022-09-01 NOTE — PROGRESS NOTE ADULT - SUBJECTIVE AND OBJECTIVE BOX
Procedure:           Colonoscopy    Indications:         screening colonoscopy, Abnormal ct scan with anemia    Monitored Anesthesia Care provided by : DR ABE Rob Quality : 9/9  ____________________________________________________________________________________________________  Procedure:           Pre-Anesthesia Assessment:                       - Prior to the procedure, a History and Physical was performed, and patient                        medications and allergies were reviewed. The patient is competent. The risks                        and benefits of the procedure and the sedation options and risks were                        discussed with the patient. All questions were answered and informed consent                        was obtained. Patient identification and proposed procedure were verified by                        the physician, the nurse and the anesthesiologist in the procedure room.                        Mental Status Examination: alert and oriented. Airway Examination: normal                        oropharyngeal airway and neck mobility. Respiratory Examination: clear to                        auscultation. CV Examination: normal. Prophylactic Antibiotics: The patient                        does not require prophylactic antibiotics.                        Grade Assessment: III - A patient with severe systemic disease. After                        reviewing the risks and benefits, the patient was deemed in satisfactory                        condition to undergo the procedure. The anesthesia plan was to use monitored                        anesthesia care (MAC). Immediately prior to administration of medications,                        the patient was re-assessed for adequacy to receive sedatives. The heart                        rate, respiratory rate, oxygen saturations, blood pressure, adequacy of                        pulmonary ventilation, and response to care were monitored throughout the                        procedure. The physical status of the patient was re-assessed after the   	 	     procedure.                       After I obtained informed consent, the scope was passed under direct vision.                        Throughout the procedure, the patient's blood pressure, pulse, and oxygen                        saturations were monitored continuously. The Colonoscope was introduced                        through the anus and advanced to nearly obstructing ascending colon mass unable to traverse lumen                         The colonoscopy was performed without                        difficulty. The patient tolerated the procedure well. The quality of the                        bowel preparation was good.  Withdrawal Time :         RECTUM: grade1 hemorrhoids    SIGMOID: normal    DESCENDING COLON: normal    TRANSVERSE COLON: normal    ASCENDING COLON: nearly obstructing mass,  friable,  annular, unable to traverse lumen distally. multiple biopsies were obtained and a LORENZO ink tattoo placed at distal margin    CECUM: not seen    TERMINAL ILEUM: not seen    The patient tolerated the procedure well.

## 2022-09-01 NOTE — PROGRESS NOTE ADULT - SUBJECTIVE AND OBJECTIVE BOX
INTERVAL HPI/OVERNIGHT EVENTS:        REVIEW OF SYSTEMS:  CONSTITUTIONAL:   anxious  about   probable  colon CA    NECK: No pain or stiffnes  RESPIRATORY: No SOB   CARDIOVASCULAR: No chest pain, palpitations, dizziness,   GASTROINTESTINAL: No abdominal pain. No nausea, vomiting,   NEUROLOGICAL: No headaches, no  blurry  vision no  dizziness  SKIN: No itching,   MUSCULOSKELETAL: No pain    MEDICATION:  hydrALAZINE Injectable 10 milliGRAM(s) IV Push every 4 hours PRN  pantoprazole    Tablet 40 milliGRAM(s) Oral before breakfast  sodium chloride 0.9%. 1000 milliLiter(s) IV Continuous <Continuous>    Vital Signs Last 24 Hrs  T(C): 36.7 (01 Sep 2022 05:22), Max: 36.8 (31 Aug 2022 12:07)  T(F): 98.1 (01 Sep 2022 05:22), Max: 98.2 (31 Aug 2022 12:07)  HR: 93 (01 Sep 2022 05:22) (88 - 100)  BP: 163/81 (01 Sep 2022 05:22) (130/72 - 180/71)  BP(mean): --  RR: 18 (01 Sep 2022 05:22) (17 - 18)  SpO2: 96% (01 Sep 2022 05:22) (96% - 98%)    Parameters below as of 01 Sep 2022 05:22  Patient On (Oxygen Delivery Method): room air        PHYSICAL EXAM:  GENERAL: NAD, well-groomed, well-developed  HEENT : Conjuntivae  clear sclerae anicteric  NECK: Supple, No JVD, Normal thyroid  NERVOUS SYSTEM:  Alert oriented   no  focal  deficits;   CHEST/LUNG: Clear    HEART: Regular rate and rhythm; No murmurs, rubs, or gallops  ABDOMEN: Soft, Nontender, Nondistended; Bowel sounds present  EXTREMITIES:  no  edema no  tenderness  SKIN: No rashes   LABS:                        9.3    5.49  )-----------( 367      ( 31 Aug 2022 08:01 )             30.2     08-31    140  |  107  |  14  ----------------------------<  96  4.0   |  28  |  0.73    Ca    8.6      31 Aug 2022 08:01    TPro  7.1  /  Alb  2.9<L>  /  TBili  0.5  /  DBili  x   /  AST  18  /  ALT  15  /  AlkPhos  64  08-31        CAPILLARY BLOOD GLUCOSE          RADIOLOGY & ADDITIONAL TESTS:    Imaging reports  Personally Reviewed:  [ ] YES  [ ] NO    Consultant(s) Notes Reviewed:  [x ] YES  [ ] NO    Care Discussed with Consultants/Other Providers [x ] YES  [ ] NO  Problem/Plan - 1:  ·  Problem: Anemia. colon  mass  ·  Plan:   s/p  transfusion 2UPRBCs GI  evaluation. appreciated for  colonoscopy  surgical  evalaution  with  Dr Lu  called    Problem/Plan - 2:  ·  Problem: Anxiety.   ·  Plan: xanx prn.    Problem/Plan - 3:  ·  Problem: History of bladder cancer.   ·  Plan: u/a  observation.        Electronic Signatures:

## 2022-09-02 LAB
ANION GAP SERPL CALC-SCNC: 6 MMOL/L — SIGNIFICANT CHANGE UP (ref 5–17)
BUN SERPL-MCNC: 16 MG/DL — SIGNIFICANT CHANGE UP (ref 7–23)
CALCIUM SERPL-MCNC: 8.4 MG/DL — LOW (ref 8.5–10.1)
CHLORIDE SERPL-SCNC: 108 MMOL/L — SIGNIFICANT CHANGE UP (ref 96–108)
CO2 SERPL-SCNC: 26 MMOL/L — SIGNIFICANT CHANGE UP (ref 22–31)
CREAT SERPL-MCNC: 0.71 MG/DL — SIGNIFICANT CHANGE UP (ref 0.5–1.3)
EGFR: 84 ML/MIN/1.73M2 — SIGNIFICANT CHANGE UP
GLUCOSE SERPL-MCNC: 96 MG/DL — SIGNIFICANT CHANGE UP (ref 70–99)
HCT VFR BLD CALC: 27.1 % — LOW (ref 34.5–45)
HGB BLD-MCNC: 8.4 G/DL — LOW (ref 11.5–15.5)
MCHC RBC-ENTMCNC: 22.2 PG — LOW (ref 27–34)
MCHC RBC-ENTMCNC: 31 G/DL — LOW (ref 32–36)
MCV RBC AUTO: 71.7 FL — LOW (ref 80–100)
NRBC # BLD: 0 /100 WBCS — SIGNIFICANT CHANGE UP (ref 0–0)
PLATELET # BLD AUTO: 338 K/UL — SIGNIFICANT CHANGE UP (ref 150–400)
POTASSIUM SERPL-MCNC: 3.4 MMOL/L — LOW (ref 3.5–5.3)
POTASSIUM SERPL-SCNC: 3.4 MMOL/L — LOW (ref 3.5–5.3)
RBC # BLD: 3.78 M/UL — LOW (ref 3.8–5.2)
RBC # FLD: 23.9 % — HIGH (ref 10.3–14.5)
SODIUM SERPL-SCNC: 140 MMOL/L — SIGNIFICANT CHANGE UP (ref 135–145)
WBC # BLD: 6.08 K/UL — SIGNIFICANT CHANGE UP (ref 3.8–10.5)
WBC # FLD AUTO: 6.08 K/UL — SIGNIFICANT CHANGE UP (ref 3.8–10.5)

## 2022-09-02 PROCEDURE — 71250 CT THORAX DX C-: CPT | Mod: 26

## 2022-09-02 PROCEDURE — 74181 MRI ABDOMEN W/O CONTRAST: CPT | Mod: 26

## 2022-09-02 RX ADMIN — PANTOPRAZOLE SODIUM 40 MILLIGRAM(S): 20 TABLET, DELAYED RELEASE ORAL at 07:57

## 2022-09-02 NOTE — PROGRESS NOTE ADULT - SUBJECTIVE AND OBJECTIVE BOX
INTERVAL HPI/OVERNIGHT EVENTS:        REVIEW OF SYSTEMS:  CONSTITUTIONAL: concerned  about likely  diagnosis  and  need  for  surgery     NECK: No pain or stiffnes  RESPIRATORY: No SOB   CARDIOVASCULAR: No chest pain, palpitations, dizziness,   GASTROINTESTINAL: No abdominal pain. No nausea, vomiting,   NEUROLOGICAL: No headaches, no  blurry  vision no  dizziness  SKIN: No itching,   MUSCULOSKELETAL: No pain    MEDICATION:  acetaminophen     Tablet .. 650 milliGRAM(s) Oral every 6 hours PRN  hydrALAZINE Injectable 10 milliGRAM(s) IV Push every 4 hours PRN  pantoprazole    Tablet 40 milliGRAM(s) Oral before breakfast  sodium chloride 0.9%. 1000 milliLiter(s) IV Continuous <Continuous>    Vital Signs Last 24 Hrs  T(C): 37.1 (02 Sep 2022 05:04), Max: 37.6 (01 Sep 2022 23:58)  T(F): 98.7 (02 Sep 2022 05:04), Max: 99.7 (01 Sep 2022 23:58)  HR: 61 (02 Sep 2022 05:04) (61 - 97)  BP: 123/67 (02 Sep 2022 05:04) (115/67 - 170/79)  BP(mean): --  RR: 16 (02 Sep 2022 05:04) (12 - 22)  SpO2: 95% (02 Sep 2022 05:04) (94% - 99%)    Parameters below as of 02 Sep 2022 05:04  Patient On (Oxygen Delivery Method): room air        PHYSICAL EXAM:  GENERAL: NAD, well-groomed, well-developed  HEENT : Conjuntivae  clear sclerae anicteric  NECK: Supple, No JVD, Normal thyroid  NERVOUS SYSTEM:  Alert oriented   no  focal  deficits;   CHEST/LUNG: Clear    HEART: Regular rate and rhythm; No murmurs, rubs, or gallops  ABDOMEN: Soft, Nontender, Nondistended; Bowel sounds present  EXTREMITIES:  no  edema no  tenderness  SKIN: No rashes   LABS:                        8.4    6.08  )-----------( 338      ( 02 Sep 2022 08:00 )             27.1     09-01    139  |  106  |  12  ----------------------------<  88  3.4<L>   |  28  |  0.76    Ca    8.3<L>      01 Sep 2022 08:52    TPro  6.6  /  Alb  2.7<L>  /  TBili  0.5  /  DBili  x   /  AST  16  /  ALT  13  /  AlkPhos  57  09-01        CAPILLARY BLOOD GLUCOSE          RADIOLOGY & ADDITIONAL TESTS:    Imaging reports  Personally Reviewed:  [ ] YES  [ ] NO    Consultant(s) Notes Reviewed:  [x ] YES  [ ] NO    Care Discussed with Consultants/Other Providers [x ] YES  [ ] NO  Problem/Plan - 1:  ·  Problem: Anemia. colon  mass  ·  Plan:   s/p  transfusion 2 UPRBCs  s/p colonoscopy    surgical  evalaution  with  Dr Lu    check  pathology  report  for  oncology  evaluation    Problem/Plan - 2:  ·  Problem: Anxiety.   ·  Plan: xanx prn.    Problem/Plan - 3:  ·  Problem: History of bladder cancer.   ·  Plan: u/a  observation.        Electronic Signatures:

## 2022-09-02 NOTE — PROGRESS NOTE ADULT - SUBJECTIVE AND OBJECTIVE BOX
HPI: 86 year old female with colon mass, no overnight events.     Vital Signs Last 24 Hrs  T(C): 36.6 (02 Sep 2022 12:33), Max: 37.6 (01 Sep 2022 23:58)  T(F): 97.9 (02 Sep 2022 12:33), Max: 99.7 (01 Sep 2022 23:58)  HR: 94 (02 Sep 2022 12:33) (61 - 97)  BP: 135/100 (02 Sep 2022 12:33) (123/67 - 135/100)  BP(mean): --  RR: 17 (02 Sep 2022 12:33) (16 - 18)  SpO2: 98% (02 Sep 2022 12:33) (94% - 98%)    Parameters below as of 02 Sep 2022 12:33  Patient On (Oxygen Delivery Method): room air        MEDICATIONS  (STANDING):  pantoprazole    Tablet 40 milliGRAM(s) Oral before breakfast  sodium chloride 0.9%. 1000 milliLiter(s) (75 mL/Hr) IV Continuous <Continuous>    MEDICATIONS  (PRN):  acetaminophen     Tablet .. 650 milliGRAM(s) Oral every 6 hours PRN Mild Pain (1 - 3)  hydrALAZINE Injectable 10 milliGRAM(s) IV Push every 4 hours PRN htn      PHYSICAL EXAM:      Constitutional: awake and alert.  HEENT: PERRLA, EOMI,   Neck: Supple.  Respiratory: Breath sounds are clear bilaterally  Cardiovascular: S1 and S2, regular  Gastrointestinal: soft, nontender not distended.   Musculoskeletal: no joint swelling/tenderness, no abnormal movements  Skin: No rashes          LABS:                         8.4    6.08  )-----------( 338      ( 02 Sep 2022 08:00 )             27.1     09-02    140  |  108  |  16  ----------------------------<  96  3.4<L>   |  26  |  0.71    Ca    8.4<L>      02 Sep 2022 08:00    TPro  6.6  /  Alb  2.7<L>  /  TBili  0.5  /  DBili  x   /  AST  16  /  ALT  13  /  AlkPhos  57  09-01    LIVER FUNCTIONS - ( 01 Sep 2022 08:52 )  Alb: 2.7 g/dL / Pro: 6.6 gm/dL / ALK PHOS: 57 U/L / ALT: 13 U/L / AST: 16 U/L / GGT: x           `Carcinoembryonic Antigen: 10.4: Method: Roche Electrochemiluminescence Immuno Assay   RADIOLOGY:  < from: MR MRCP No Cont (09.02.22 @ 17:00) >  Limited examination.  Normal caliber biliary tree.  Apparent periampullary duodenal diverticulum    < end of copied text >    < from: CT Chest No Cont (09.02.22 @ 12:02) >  Scattered pulmonary nodules measuring upto 4 mm, which are nonspecific.   Surveillance is recommended.    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    Fluid distention of the appendix secondary colonic mass obstructing   appendiceal orifice.    Subcentimeter liver lesions too small to characterize, likely cysts.    Abrupt termination of the CBD, possibly related to intraluminal debris or   mass. Consider further evaluation with MRI/MRCP.      < end of copied text >

## 2022-09-03 LAB
ALBUMIN SERPL ELPH-MCNC: 2.3 G/DL — LOW (ref 3.3–5)
ALP SERPL-CCNC: 48 U/L — SIGNIFICANT CHANGE UP (ref 40–120)
ALT FLD-CCNC: 13 U/L — SIGNIFICANT CHANGE UP (ref 12–78)
ANION GAP SERPL CALC-SCNC: 4 MMOL/L — LOW (ref 5–17)
AST SERPL-CCNC: 10 U/L — LOW (ref 15–37)
BILIRUB SERPL-MCNC: 0.3 MG/DL — SIGNIFICANT CHANGE UP (ref 0.2–1.2)
BUN SERPL-MCNC: 17 MG/DL — SIGNIFICANT CHANGE UP (ref 7–23)
CALCIUM SERPL-MCNC: 8.2 MG/DL — LOW (ref 8.5–10.1)
CHLORIDE SERPL-SCNC: 108 MMOL/L — SIGNIFICANT CHANGE UP (ref 96–108)
CO2 SERPL-SCNC: 29 MMOL/L — SIGNIFICANT CHANGE UP (ref 22–31)
CREAT SERPL-MCNC: 0.71 MG/DL — SIGNIFICANT CHANGE UP (ref 0.5–1.3)
EGFR: 84 ML/MIN/1.73M2 — SIGNIFICANT CHANGE UP
GLUCOSE SERPL-MCNC: 91 MG/DL — SIGNIFICANT CHANGE UP (ref 70–99)
HCT VFR BLD CALC: 27.4 % — LOW (ref 34.5–45)
HGB BLD-MCNC: 8.5 G/DL — LOW (ref 11.5–15.5)
MCHC RBC-ENTMCNC: 22.5 PG — LOW (ref 27–34)
MCHC RBC-ENTMCNC: 31 G/DL — LOW (ref 32–36)
MCV RBC AUTO: 72.7 FL — LOW (ref 80–100)
NRBC # BLD: 0 /100 WBCS — SIGNIFICANT CHANGE UP (ref 0–0)
PLATELET # BLD AUTO: 342 K/UL — SIGNIFICANT CHANGE UP (ref 150–400)
POTASSIUM SERPL-MCNC: 3.4 MMOL/L — LOW (ref 3.5–5.3)
POTASSIUM SERPL-SCNC: 3.4 MMOL/L — LOW (ref 3.5–5.3)
PROT SERPL-MCNC: 5.9 GM/DL — LOW (ref 6–8.3)
RBC # BLD: 3.77 M/UL — LOW (ref 3.8–5.2)
RBC # FLD: 24.1 % — HIGH (ref 10.3–14.5)
SODIUM SERPL-SCNC: 141 MMOL/L — SIGNIFICANT CHANGE UP (ref 135–145)
WBC # BLD: 4.96 K/UL — SIGNIFICANT CHANGE UP (ref 3.8–10.5)
WBC # FLD AUTO: 4.96 K/UL — SIGNIFICANT CHANGE UP (ref 3.8–10.5)

## 2022-09-03 RX ORDER — ALPRAZOLAM 0.25 MG
0.25 TABLET ORAL
Refills: 0 | Status: DISCONTINUED | OUTPATIENT
Start: 2022-09-03 | End: 2022-09-06

## 2022-09-03 RX ADMIN — Medication 650 MILLIGRAM(S): at 06:58

## 2022-09-03 RX ADMIN — Medication 650 MILLIGRAM(S): at 23:52

## 2022-09-03 RX ADMIN — PANTOPRAZOLE SODIUM 40 MILLIGRAM(S): 20 TABLET, DELAYED RELEASE ORAL at 07:42

## 2022-09-03 RX ADMIN — Medication 650 MILLIGRAM(S): at 07:25

## 2022-09-03 RX ADMIN — Medication 0.25 MILLIGRAM(S): at 08:40

## 2022-09-03 RX ADMIN — Medication 0.25 MILLIGRAM(S): at 17:21

## 2022-09-03 NOTE — PROGRESS NOTE ADULT - SUBJECTIVE AND OBJECTIVE BOX
INTERVAL HPI/OVERNIGHT EVENTS:        REVIEW OF SYSTEMS:  CONSTITUTIONAL: patient  anxious  crying  not  sleeping  well    NECK: No pain or stiffnes  RESPIRATORY: No SOB   CARDIOVASCULAR: No chest pain, palpitations, dizziness,   GASTROINTESTINAL: No abdominal pain. No nausea, vomiting,   NEUROLOGICAL: No headaches, no  blurry  vision no  dizziness  SKIN: No itching,   MUSCULOSKELETAL: No pain    MEDICATION:  acetaminophen     Tablet .. 650 milliGRAM(s) Oral every 6 hours PRN  ALPRAZolam 0.25 milliGRAM(s) Oral two times a day  hydrALAZINE Injectable 10 milliGRAM(s) IV Push every 4 hours PRN  pantoprazole    Tablet 40 milliGRAM(s) Oral before breakfast  sodium chloride 0.9%. 1000 milliLiter(s) IV Continuous <Continuous>    Vital Signs Last 24 Hrs  T(C): 36.8 (03 Sep 2022 05:21), Max: 36.8 (02 Sep 2022 17:45)  T(F): 98.2 (03 Sep 2022 05:21), Max: 98.2 (02 Sep 2022 17:45)  HR: 86 (03 Sep 2022 05:21) (73 - 94)  BP: 145/90 (03 Sep 2022 05:21) (117/70 - 145/90)  BP(mean): --  RR: 17 (03 Sep 2022 05:21) (17 - 18)  SpO2: 95% (03 Sep 2022 05:21) (95% - 98%)    Parameters below as of 03 Sep 2022 05:21  Patient On (Oxygen Delivery Method): room air        PHYSICAL EXAM:  GENERAL: NAD, well-groomed, well-developed  HEENT : Conjuntivae  clear sclerae anicteric  NECK: Supple, No JVD, Normal thyroid  NERVOUS SYSTEM:  Alert oriented   no  focal  deficits;   CHEST/LUNG: Clear    HEART: Regular rate and rhythm; No murmurs, rubs, or gallops  ABDOMEN: Soft, Nontender, Nondistended; Bowel sounds present  EXTREMITIES:  no  edema no  tenderness  SKIN: No rashes   LABS:                        8.5    4.96  )-----------( 342      ( 03 Sep 2022 06:55 )             27.4     09-03    141  |  108  |  17  ----------------------------<  91  3.4<L>   |  29  |  0.71    Ca    8.2<L>      03 Sep 2022 06:55    TPro  5.9<L>  /  Alb  2.3<L>  /  TBili  0.3  /  DBili  x   /  AST  10<L>  /  ALT  13  /  AlkPhos  48  09-03        CAPILLARY BLOOD GLUCOSE          RADIOLOGY & ADDITIONAL TESTS:    Imaging reports  Personally Reviewed:  [ ] YES  [ ] NO    Consultant(s) Notes Reviewed:  [x ] YES  [ ] NO    Care Discussed with Consultants/Other Providers [ x] YES  [ ] NO  Problem/Plan - 1:  ·  Problem: Anemia. colon  mass  ·  Plan:   s/p  transfusion 2 UPRBCs  s/p colonoscopy    surgical  evalaution  with  Dr Lu    check  pathology  report  for  oncology  evaluation    Problem/Plan - 2:  ·  Problem: Anxiety.   ·  Plan: xanx prn.    Problem/Plan - 3:  ·  Problem: History of bladder cancer.   ·  Plan: u/a  observation.  ANXIETY  start  xanax

## 2022-09-04 LAB
ANION GAP SERPL CALC-SCNC: 6 MMOL/L — SIGNIFICANT CHANGE UP (ref 5–17)
BUN SERPL-MCNC: 22 MG/DL — SIGNIFICANT CHANGE UP (ref 7–23)
CALCIUM SERPL-MCNC: 8.5 MG/DL — SIGNIFICANT CHANGE UP (ref 8.5–10.1)
CHLORIDE SERPL-SCNC: 106 MMOL/L — SIGNIFICANT CHANGE UP (ref 96–108)
CO2 SERPL-SCNC: 26 MMOL/L — SIGNIFICANT CHANGE UP (ref 22–31)
CREAT SERPL-MCNC: 0.89 MG/DL — SIGNIFICANT CHANGE UP (ref 0.5–1.3)
EGFR: 64 ML/MIN/1.73M2 — SIGNIFICANT CHANGE UP
GLUCOSE SERPL-MCNC: 132 MG/DL — HIGH (ref 70–99)
HCT VFR BLD CALC: 30.7 % — LOW (ref 34.5–45)
HGB BLD-MCNC: 9 G/DL — LOW (ref 11.5–15.5)
MCHC RBC-ENTMCNC: 22 PG — LOW (ref 27–34)
MCHC RBC-ENTMCNC: 29.3 G/DL — LOW (ref 32–36)
MCV RBC AUTO: 75.1 FL — LOW (ref 80–100)
NRBC # BLD: 0 /100 WBCS — SIGNIFICANT CHANGE UP (ref 0–0)
PLATELET # BLD AUTO: 312 K/UL — SIGNIFICANT CHANGE UP (ref 150–400)
POTASSIUM SERPL-MCNC: 3.5 MMOL/L — SIGNIFICANT CHANGE UP (ref 3.5–5.3)
POTASSIUM SERPL-SCNC: 3.5 MMOL/L — SIGNIFICANT CHANGE UP (ref 3.5–5.3)
RBC # BLD: 4.09 M/UL — SIGNIFICANT CHANGE UP (ref 3.8–5.2)
RBC # FLD: 24.8 % — HIGH (ref 10.3–14.5)
SODIUM SERPL-SCNC: 138 MMOL/L — SIGNIFICANT CHANGE UP (ref 135–145)
WBC # BLD: 5.25 K/UL — SIGNIFICANT CHANGE UP (ref 3.8–10.5)
WBC # FLD AUTO: 5.25 K/UL — SIGNIFICANT CHANGE UP (ref 3.8–10.5)

## 2022-09-04 PROCEDURE — 73562 X-RAY EXAM OF KNEE 3: CPT | Mod: 26,RT

## 2022-09-04 RX ORDER — ACETAMINOPHEN 500 MG
1000 TABLET ORAL ONCE
Refills: 0 | Status: COMPLETED | OUTPATIENT
Start: 2022-09-04 | End: 2022-09-04

## 2022-09-04 RX ADMIN — SODIUM CHLORIDE 75 MILLILITER(S): 9 INJECTION INTRAMUSCULAR; INTRAVENOUS; SUBCUTANEOUS at 05:41

## 2022-09-04 RX ADMIN — PANTOPRAZOLE SODIUM 40 MILLIGRAM(S): 20 TABLET, DELAYED RELEASE ORAL at 07:52

## 2022-09-04 RX ADMIN — Medication 650 MILLIGRAM(S): at 00:52

## 2022-09-04 RX ADMIN — Medication 0.25 MILLIGRAM(S): at 17:59

## 2022-09-04 RX ADMIN — Medication 0.25 MILLIGRAM(S): at 05:42

## 2022-09-04 RX ADMIN — Medication 1 DROP(S): at 00:44

## 2022-09-04 RX ADMIN — Medication 1000 MILLIGRAM(S): at 18:43

## 2022-09-04 RX ADMIN — Medication 400 MILLIGRAM(S): at 17:59

## 2022-09-04 NOTE — PROGRESS NOTE ADULT - SUBJECTIVE AND OBJECTIVE BOX
INTERVAL HPI/OVERNIGHT EVENTS:        REVIEW OF SYSTEMS:  CONSTITUTIONAL:  comfortable    NECK: No pain or stiffnes  RESPIRATORY: No SOB   CARDIOVASCULAR: No chest pain, palpitations, dizziness,   GASTROINTESTINAL: No abdominal pain. No nausea, vomiting,   NEUROLOGICAL: No headaches, no  blurry  vision no  dizziness  SKIN: No itching,   MUSCULOSKELETAL: No pain    MEDICATION:  acetaminophen     Tablet .. 650 milliGRAM(s) Oral every 6 hours PRN  ALPRAZolam 0.25 milliGRAM(s) Oral two times a day  artificial  tears Solution 1 Drop(s) Right EYE every 2 hours PRN  hydrALAZINE Injectable 10 milliGRAM(s) IV Push every 4 hours PRN  pantoprazole    Tablet 40 milliGRAM(s) Oral before breakfast  sodium chloride 0.9%. 1000 milliLiter(s) IV Continuous <Continuous>    Vital Signs Last 24 Hrs  T(C): 36.3 (04 Sep 2022 05:15), Max: 37.2 (03 Sep 2022 17:01)  T(F): 97.4 (04 Sep 2022 05:15), Max: 99 (03 Sep 2022 17:01)  HR: 87 (04 Sep 2022 05:15) (87 - 101)  BP: 156/81 (04 Sep 2022 05:15) (137/68 - 156/81)  BP(mean): --  RR: 18 (04 Sep 2022 05:15) (17 - 18)  SpO2: 98% (04 Sep 2022 05:15) (96% - 99%)    Parameters below as of 03 Sep 2022 11:38  Patient On (Oxygen Delivery Method): room air        PHYSICAL EXAM:  GENERAL: NAD, well-groomed, well-developed  HEENT : Conjuntivae  clear sclerae anicteric  NECK: Supple, No JVD, Normal thyroid  NERVOUS SYSTEM:  Alert oriented   no  focal  deficits;   CHEST/LUNG: Clear    HEART: Regular rate and rhythm; No murmurs, rubs, or gallops  ABDOMEN: Soft, Nontender, Nondistended; Bowel sounds present  EXTREMITIES:  no  edema no  tenderness  SKIN: No rashes   LABS:                        9.0    5.25  )-----------( 312      ( 04 Sep 2022 06:37 )             30.7     09-04    138  |  106  |  22  ----------------------------<  132<H>  3.5   |  26  |  0.89    Ca    8.5      04 Sep 2022 06:37    TPro  5.9<L>  /  Alb  2.3<L>  /  TBili  0.3  /  DBili  x   /  AST  10<L>  /  ALT  13  /  AlkPhos  48  09-03        CAPILLARY BLOOD GLUCOSE          RADIOLOGY & ADDITIONAL TESTS:    Imaging reports  Personally Reviewed:  [ ] YES  [ ] NO    Consultant(s) Notes Reviewed:  [ x] YES  [ ] NO    Care Discussed with Consultants/Other Providers [x ] YES  [ ] NO  Problem/Plan - 1:  ·  Problem: Anemia. colon  mass  ·  Plan:   s/p  transfusion 2 UPRBCs  s/p colonoscopy    s/p surgical  evalaution   check  pathology  report  for  oncology  evaluation     Problem/Plan - 2:  ·  Problem: Anxiety.   ·  Plan: xanx prn.    Problem/Plan - 3:  ·  Problem: History of bladder cancer.   ·  Plan: u/a  observation.  ANXIETY  start  xanax

## 2022-09-05 ENCOUNTER — TRANSCRIPTION ENCOUNTER (OUTPATIENT)
Age: 87
End: 2022-09-05

## 2022-09-05 LAB
ANION GAP SERPL CALC-SCNC: 6 MMOL/L — SIGNIFICANT CHANGE UP (ref 5–17)
BLD GP AB SCN SERPL QL: SIGNIFICANT CHANGE UP
BUN SERPL-MCNC: 17 MG/DL — SIGNIFICANT CHANGE UP (ref 7–23)
CALCIUM SERPL-MCNC: 8.4 MG/DL — LOW (ref 8.5–10.1)
CHLORIDE SERPL-SCNC: 106 MMOL/L — SIGNIFICANT CHANGE UP (ref 96–108)
CO2 SERPL-SCNC: 29 MMOL/L — SIGNIFICANT CHANGE UP (ref 22–31)
CREAT SERPL-MCNC: 0.66 MG/DL — SIGNIFICANT CHANGE UP (ref 0.5–1.3)
EGFR: 86 ML/MIN/1.73M2 — SIGNIFICANT CHANGE UP
GLUCOSE SERPL-MCNC: 96 MG/DL — SIGNIFICANT CHANGE UP (ref 70–99)
HCT VFR BLD CALC: 27.3 % — LOW (ref 34.5–45)
HGB BLD-MCNC: 8.2 G/DL — LOW (ref 11.5–15.5)
MCHC RBC-ENTMCNC: 22.5 PG — LOW (ref 27–34)
MCHC RBC-ENTMCNC: 30 G/DL — LOW (ref 32–36)
MCV RBC AUTO: 75 FL — LOW (ref 80–100)
NRBC # BLD: 0 /100 WBCS — SIGNIFICANT CHANGE UP (ref 0–0)
PLATELET # BLD AUTO: 311 K/UL — SIGNIFICANT CHANGE UP (ref 150–400)
POTASSIUM SERPL-MCNC: 3.9 MMOL/L — SIGNIFICANT CHANGE UP (ref 3.5–5.3)
POTASSIUM SERPL-SCNC: 3.9 MMOL/L — SIGNIFICANT CHANGE UP (ref 3.5–5.3)
RBC # BLD: 3.64 M/UL — LOW (ref 3.8–5.2)
RBC # FLD: 25.1 % — HIGH (ref 10.3–14.5)
SODIUM SERPL-SCNC: 141 MMOL/L — SIGNIFICANT CHANGE UP (ref 135–145)
URATE SERPL-MCNC: 3.5 MG/DL — SIGNIFICANT CHANGE UP (ref 2.5–7)
WBC # BLD: 4.18 K/UL — SIGNIFICANT CHANGE UP (ref 3.8–10.5)
WBC # FLD AUTO: 4.18 K/UL — SIGNIFICANT CHANGE UP (ref 3.8–10.5)

## 2022-09-05 PROCEDURE — 93306 TTE W/DOPPLER COMPLETE: CPT | Mod: 26

## 2022-09-05 RX ORDER — NEOMYCIN SULFATE 500 MG/1
1000 TABLET ORAL
Refills: 0 | Status: COMPLETED | OUTPATIENT
Start: 2022-09-05 | End: 2022-09-05

## 2022-09-05 RX ORDER — METRONIDAZOLE 500 MG
500 TABLET ORAL
Refills: 0 | Status: COMPLETED | OUTPATIENT
Start: 2022-09-05 | End: 2022-09-05

## 2022-09-05 RX ORDER — LIDOCAINE 4 G/100G
1 CREAM TOPICAL DAILY
Refills: 0 | Status: DISCONTINUED | OUTPATIENT
Start: 2022-09-05 | End: 2022-09-06

## 2022-09-05 RX ADMIN — LIDOCAINE 1 PATCH: 4 CREAM TOPICAL at 14:30

## 2022-09-05 RX ADMIN — Medication 500 MILLIGRAM(S): at 21:42

## 2022-09-05 RX ADMIN — NEOMYCIN SULFATE 1000 MILLIGRAM(S): 500 TABLET ORAL at 16:11

## 2022-09-05 RX ADMIN — Medication 500 MILLIGRAM(S): at 14:30

## 2022-09-05 RX ADMIN — Medication 500 MILLIGRAM(S): at 16:11

## 2022-09-05 RX ADMIN — NEOMYCIN SULFATE 1000 MILLIGRAM(S): 500 TABLET ORAL at 21:42

## 2022-09-05 RX ADMIN — LIDOCAINE 1 PATCH: 4 CREAM TOPICAL at 19:46

## 2022-09-05 RX ADMIN — Medication 0.25 MILLIGRAM(S): at 06:33

## 2022-09-05 RX ADMIN — NEOMYCIN SULFATE 1000 MILLIGRAM(S): 500 TABLET ORAL at 14:30

## 2022-09-05 RX ADMIN — Medication 0.25 MILLIGRAM(S): at 17:14

## 2022-09-05 NOTE — PROGRESS NOTE ADULT - SUBJECTIVE AND OBJECTIVE BOX
Surgery NP note  Patient seen and examined bedside resting comfortably.  No complaints offered. No Abdominal pain. Denies nausea and vomiting. Tolerating diet.    T(F): 98.5 (09-05-22 @ 05:14), Max: 98.5 (09-05-22 @ 05:14)  HR: 83 (09-05-22 @ 05:14) (83 - 101)  BP: 166/90 (09-05-22 @ 05:14) (114/67 - 172/92)  RR: 18 (09-05-22 @ 05:14) (17 - 18)  SpO2: 97% (09-05-22 @ 05:14) (96% - 98%)  Wt(kg): --  CAPILLARY BLOOD GLUCOSE          PHYSICAL EXAM:  General: NAD, WDWN.   Neuro:  Alert & responsive  HEENT: NCAT, EOMI, conjunctiva clear  CV: +S1+S2 regular rate and rhythm  Lung: clear to ausculation bilaterally, respirations nonlabored, good inspiratory effort  Abdomen: soft, Non tender, No Distension. Normal active BS  Extremities: no pedal edema or calf tenderness noted     LABS:                        8.2    4.18  )-----------( 311      ( 05 Sep 2022 07:30 )             27.3     09-05    141  |  106  |  17  ----------------------------<  96  3.9   |  29  |  0.66    Ca    8.4<L>      05 Sep 2022 07:30

## 2022-09-05 NOTE — PROGRESS NOTE ADULT - SUBJECTIVE AND OBJECTIVE BOX
INTERVAL HPI/OVERNIGHT EVENTS:        REVIEW OF SYSTEMS:  CONSTITUTIONAL:  c/o rt  knee  pain     NECK: No pain or stiffnes  RESPIRATORY: No SOB   CARDIOVASCULAR: No chest pain, palpitations, dizziness,   GASTROINTESTINAL: No abdominal pain. No nausea, vomiting,   NEUROLOGICAL: No headaches, no  blurry  vision no  dizziness  SKIN: No itching,   MUSCULOSKELETAL: No pain    MEDICATION:  acetaminophen     Tablet .. 650 milliGRAM(s) Oral every 6 hours PRN  ALPRAZolam 0.25 milliGRAM(s) Oral two times a day  artificial  tears Solution 1 Drop(s) Right EYE every 2 hours PRN  hydrALAZINE Injectable 10 milliGRAM(s) IV Push every 4 hours PRN  metroNIDAZOLE    Tablet 500 milliGRAM(s) Oral <User Schedule>  neomycin 1000 milliGRAM(s) Oral <User Schedule>  pantoprazole    Tablet 40 milliGRAM(s) Oral before breakfast  sodium chloride 0.9%. 1000 milliLiter(s) IV Continuous <Continuous>    Vital Signs Last 24 Hrs  T(C): 36.9 (05 Sep 2022 05:14), Max: 36.9 (05 Sep 2022 05:14)  T(F): 98.5 (05 Sep 2022 05:14), Max: 98.5 (05 Sep 2022 05:14)  HR: 83 (05 Sep 2022 05:14) (83 - 101)  BP: 166/90 (05 Sep 2022 05:14) (114/67 - 172/92)  BP(mean): --  RR: 18 (05 Sep 2022 05:14) (17 - 18)  SpO2: 97% (05 Sep 2022 05:14) (96% - 98%)        PHYSICAL EXAM:  GENERAL: NAD, well-groomed, well-developed  HEENT : Conjuntivae  clear sclerae anicteric  NECK: Supple, No JVD, Normal thyroid  NERVOUS SYSTEM:  Alert oriented   no  focal  deficits;   CHEST/LUNG: Clear    HEART: Regular rate and rhythm; No murmurs, rubs, or gallops  ABDOMEN: Soft, Nontender, Nondistended; Bowel sounds present  EXTREMITIES:  mild  diffuse   tenderness no  erythema   SKIN: No rashes   LABS:                        8.2    4.18  )-----------( 311      ( 05 Sep 2022 07:30 )             27.3     09-05    141  |  106  |  17  ----------------------------<  96  3.9   |  29  |  0.66    Ca    8.4<L>      05 Sep 2022 07:30          CAPILLARY BLOOD GLUCOSE          RADIOLOGY & ADDITIONAL TESTS:    Imaging reports  Personally Reviewed:  [ ] YES  [ ] NO    Consultant(s) Notes Reviewed:  [ x] YES  [ ] NO    Care Discussed with Consultants/Other Providers [ x] YES  [ ] NO   Problem/Plan - 1:  ·  Problem: Anemia. colon  mass  ·  Plan:   s/p  transfusion 2 UPRBCs  s/p colonoscopy    s/p surgical  evalaution   check  pathology  report  for  oncology  evaluation     Problem/Plan - 2:  ·  Problem: Anxiety.   ·  Plan: xanx prn.    Problem/Plan - 3:  ·  Problem: History of bladder cancer.   ·  Plan: u/a  observation.    knee  pain  OA  tylenol lidocaine  patch  for  trnasfusion  TTE  preoperatively  as  per  Alissa

## 2022-09-06 ENCOUNTER — TRANSCRIPTION ENCOUNTER (OUTPATIENT)
Age: 87
End: 2022-09-06

## 2022-09-06 ENCOUNTER — RESULT REVIEW (OUTPATIENT)
Age: 87
End: 2022-09-06

## 2022-09-06 LAB
ANION GAP SERPL CALC-SCNC: 4 MMOL/L — LOW (ref 5–17)
APTT BLD: 26 SEC — LOW (ref 27.5–35.5)
BUN SERPL-MCNC: 15 MG/DL — SIGNIFICANT CHANGE UP (ref 7–23)
CALCIUM SERPL-MCNC: 7.8 MG/DL — LOW (ref 8.5–10.1)
CHLORIDE SERPL-SCNC: 106 MMOL/L — SIGNIFICANT CHANGE UP (ref 96–108)
CO2 SERPL-SCNC: 28 MMOL/L — SIGNIFICANT CHANGE UP (ref 22–31)
CREAT SERPL-MCNC: 0.71 MG/DL — SIGNIFICANT CHANGE UP (ref 0.5–1.3)
EGFR: 84 ML/MIN/1.73M2 — SIGNIFICANT CHANGE UP
FLUAV AG NPH QL: SIGNIFICANT CHANGE UP
FLUBV AG NPH QL: SIGNIFICANT CHANGE UP
GLUCOSE BLDC GLUCOMTR-MCNC: 175 MG/DL — HIGH (ref 70–99)
GLUCOSE SERPL-MCNC: 94 MG/DL — SIGNIFICANT CHANGE UP (ref 70–99)
HCT VFR BLD CALC: 31.7 % — LOW (ref 34.5–45)
HGB BLD-MCNC: 9.9 G/DL — LOW (ref 11.5–15.5)
INR BLD: 1.05 RATIO — SIGNIFICANT CHANGE UP (ref 0.88–1.16)
MAGNESIUM SERPL-MCNC: 2.2 MG/DL — SIGNIFICANT CHANGE UP (ref 1.6–2.6)
MCHC RBC-ENTMCNC: 23.6 PG — LOW (ref 27–34)
MCHC RBC-ENTMCNC: 31.2 G/DL — LOW (ref 32–36)
MCV RBC AUTO: 75.5 FL — LOW (ref 80–100)
NRBC # BLD: 0 /100 WBCS — SIGNIFICANT CHANGE UP (ref 0–0)
PHOSPHATE SERPL-MCNC: 3.6 MG/DL — SIGNIFICANT CHANGE UP (ref 2.5–4.5)
PLATELET # BLD AUTO: 309 K/UL — SIGNIFICANT CHANGE UP (ref 150–400)
POTASSIUM SERPL-MCNC: 3.7 MMOL/L — SIGNIFICANT CHANGE UP (ref 3.5–5.3)
POTASSIUM SERPL-SCNC: 3.7 MMOL/L — SIGNIFICANT CHANGE UP (ref 3.5–5.3)
PROTHROM AB SERPL-ACNC: 12.5 SEC — SIGNIFICANT CHANGE UP (ref 10.5–13.4)
RBC # BLD: 4.2 M/UL — SIGNIFICANT CHANGE UP (ref 3.8–5.2)
RBC # FLD: 24.3 % — HIGH (ref 10.3–14.5)
SARS-COV-2 RNA SPEC QL NAA+PROBE: SIGNIFICANT CHANGE UP
SODIUM SERPL-SCNC: 138 MMOL/L — SIGNIFICANT CHANGE UP (ref 135–145)
SURGICAL PATHOLOGY STUDY: SIGNIFICANT CHANGE UP
WBC # BLD: 4.41 K/UL — SIGNIFICANT CHANGE UP (ref 3.8–10.5)
WBC # FLD AUTO: 4.41 K/UL — SIGNIFICANT CHANGE UP (ref 3.8–10.5)

## 2022-09-06 PROCEDURE — 44205 LAP COLECTOMY PART W/ILEUM: CPT

## 2022-09-06 PROCEDURE — 88307 TISSUE EXAM BY PATHOLOGIST: CPT | Mod: 26

## 2022-09-06 PROCEDURE — 99223 1ST HOSP IP/OBS HIGH 75: CPT

## 2022-09-06 DEVICE — STAPLER COVIDIEN TRI-STAPLE 45MM TAN RELOAD: Type: IMPLANTABLE DEVICE | Site: RIGHT | Status: FUNCTIONAL

## 2022-09-06 DEVICE — STAPLER COVIDIEN TRI-STAPLE 45MM PURPLE RELOAD: Type: IMPLANTABLE DEVICE | Site: RIGHT | Status: FUNCTIONAL

## 2022-09-06 DEVICE — STAPLER COVIDIEN TRI-STAPLE 60MM TAN RELOAD: Type: IMPLANTABLE DEVICE | Site: RIGHT | Status: FUNCTIONAL

## 2022-09-06 DEVICE — STAPLER COVIDIEN TRI-STAPLE 60MM PURPLE RELOAD: Type: IMPLANTABLE DEVICE | Site: RIGHT | Status: FUNCTIONAL

## 2022-09-06 RX ORDER — CEFAZOLIN SODIUM 1 G
1000 VIAL (EA) INJECTION EVERY 8 HOURS
Refills: 0 | Status: COMPLETED | OUTPATIENT
Start: 2022-09-06 | End: 2022-09-07

## 2022-09-06 RX ORDER — KETOROLAC TROMETHAMINE 30 MG/ML
15 SYRINGE (ML) INJECTION EVERY 6 HOURS
Refills: 0 | Status: DISCONTINUED | OUTPATIENT
Start: 2022-09-06 | End: 2022-09-08

## 2022-09-06 RX ORDER — GABAPENTIN 400 MG/1
100 CAPSULE ORAL EVERY 8 HOURS
Refills: 0 | Status: DISCONTINUED | OUTPATIENT
Start: 2022-09-06 | End: 2022-09-10

## 2022-09-06 RX ORDER — FAMOTIDINE 10 MG/ML
20 INJECTION INTRAVENOUS EVERY 12 HOURS
Refills: 0 | Status: DISCONTINUED | OUTPATIENT
Start: 2022-09-06 | End: 2022-09-10

## 2022-09-06 RX ORDER — ACETAMINOPHEN 500 MG
1000 TABLET ORAL ONCE
Refills: 0 | Status: COMPLETED | OUTPATIENT
Start: 2022-09-07 | End: 2022-09-07

## 2022-09-06 RX ORDER — HYDROMORPHONE HYDROCHLORIDE 2 MG/ML
0.5 INJECTION INTRAMUSCULAR; INTRAVENOUS; SUBCUTANEOUS
Refills: 0 | Status: DISCONTINUED | OUTPATIENT
Start: 2022-09-06 | End: 2022-09-06

## 2022-09-06 RX ORDER — ENOXAPARIN SODIUM 100 MG/ML
40 INJECTION SUBCUTANEOUS EVERY 24 HOURS
Refills: 0 | Status: DISCONTINUED | OUTPATIENT
Start: 2022-09-06 | End: 2022-09-08

## 2022-09-06 RX ORDER — SODIUM CHLORIDE 9 MG/ML
1000 INJECTION, SOLUTION INTRAVENOUS
Refills: 0 | Status: DISCONTINUED | OUTPATIENT
Start: 2022-09-06 | End: 2022-09-07

## 2022-09-06 RX ORDER — MORPHINE SULFATE 50 MG/1
2 CAPSULE, EXTENDED RELEASE ORAL EVERY 6 HOURS
Refills: 0 | Status: DISCONTINUED | OUTPATIENT
Start: 2022-09-06 | End: 2022-09-07

## 2022-09-06 RX ORDER — ONDANSETRON 8 MG/1
4 TABLET, FILM COATED ORAL EVERY 6 HOURS
Refills: 0 | Status: DISCONTINUED | OUTPATIENT
Start: 2022-09-06 | End: 2022-09-10

## 2022-09-06 RX ORDER — SODIUM CHLORIDE 9 MG/ML
1000 INJECTION, SOLUTION INTRAVENOUS
Refills: 0 | Status: DISCONTINUED | OUTPATIENT
Start: 2022-09-06 | End: 2022-09-06

## 2022-09-06 RX ORDER — ACETAMINOPHEN 500 MG
1000 TABLET ORAL ONCE
Refills: 0 | Status: COMPLETED | OUTPATIENT
Start: 2022-09-06 | End: 2022-09-06

## 2022-09-06 RX ADMIN — HYDROMORPHONE HYDROCHLORIDE 0.5 MILLIGRAM(S): 2 INJECTION INTRAMUSCULAR; INTRAVENOUS; SUBCUTANEOUS at 18:47

## 2022-09-06 RX ADMIN — LIDOCAINE 1 PATCH: 4 CREAM TOPICAL at 02:56

## 2022-09-06 RX ADMIN — PANTOPRAZOLE SODIUM 40 MILLIGRAM(S): 20 TABLET, DELAYED RELEASE ORAL at 11:16

## 2022-09-06 RX ADMIN — SODIUM CHLORIDE 75 MILLILITER(S): 9 INJECTION, SOLUTION INTRAVENOUS at 22:03

## 2022-09-06 RX ADMIN — Medication 15 MILLIGRAM(S): at 22:04

## 2022-09-06 RX ADMIN — SODIUM CHLORIDE 75 MILLILITER(S): 9 INJECTION INTRAMUSCULAR; INTRAVENOUS; SUBCUTANEOUS at 05:30

## 2022-09-06 RX ADMIN — HYDROMORPHONE HYDROCHLORIDE 0.5 MILLIGRAM(S): 2 INJECTION INTRAMUSCULAR; INTRAVENOUS; SUBCUTANEOUS at 18:32

## 2022-09-06 RX ADMIN — LIDOCAINE 1 PATCH: 4 CREAM TOPICAL at 20:20

## 2022-09-06 RX ADMIN — Medication 1000 MILLIGRAM(S): at 21:00

## 2022-09-06 RX ADMIN — LIDOCAINE 1 PATCH: 4 CREAM TOPICAL at 22:55

## 2022-09-06 RX ADMIN — Medication 400 MILLIGRAM(S): at 20:41

## 2022-09-06 RX ADMIN — SODIUM CHLORIDE 50 MILLILITER(S): 9 INJECTION, SOLUTION INTRAVENOUS at 22:56

## 2022-09-06 RX ADMIN — LIDOCAINE 1 PATCH: 4 CREAM TOPICAL at 11:43

## 2022-09-06 RX ADMIN — Medication 15 MILLIGRAM(S): at 22:56

## 2022-09-06 RX ADMIN — Medication 0.25 MILLIGRAM(S): at 05:29

## 2022-09-06 RX ADMIN — Medication 100 MILLIGRAM(S): at 22:04

## 2022-09-06 RX ADMIN — SODIUM CHLORIDE 75 MILLILITER(S): 9 INJECTION, SOLUTION INTRAVENOUS at 18:49

## 2022-09-06 NOTE — PROGRESS NOTE ADULT - SUBJECTIVE AND OBJECTIVE BOX
INTERVAL HPI/OVERNIGHT EVENTS:    s/p  transfusion  one  [facility]    REVIEW OF SYSTEMS:  CONSTITUTIONAL: feels  well no  complaints    NECK: No pain or stiffnes  RESPIRATORY: No SOB   CARDIOVASCULAR: No chest pain, palpitations, dizziness,   GASTROINTESTINAL: No abdominal pain. No nausea, vomiting,   NEUROLOGICAL: No headaches, no  blurry  vision no  dizziness  SKIN: No itching,   MUSCULOSKELETAL: No pain    MEDICATION:  acetaminophen     Tablet .. 650 milliGRAM(s) Oral every 6 hours PRN  ALPRAZolam 0.25 milliGRAM(s) Oral two times a day  artificial  tears Solution 1 Drop(s) Right EYE every 2 hours PRN  hydrALAZINE Injectable 10 milliGRAM(s) IV Push every 4 hours PRN  lidocaine   4% Patch 1 Patch Transdermal daily  pantoprazole    Tablet 40 milliGRAM(s) Oral before breakfast  sodium chloride 0.9%. 1000 milliLiter(s) IV Continuous <Continuous>    Vital Signs Last 24 Hrs  T(C): 37.1 (06 Sep 2022 05:16), Max: 37.1 (05 Sep 2022 21:54)  T(F): 98.8 (06 Sep 2022 05:16), Max: 98.8 (05 Sep 2022 23:38)  HR: 91 (06 Sep 2022 05:16) (82 - 99)  BP: 145/82 (06 Sep 2022 05:16) (145/82 - 159/84)  BP(mean): --  RR: 18 (06 Sep 2022 05:16) (17 - 18)  SpO2: 98% (06 Sep 2022 05:16) (94% - 100%)    Parameters below as of 05 Sep 2022 23:38  Patient On (Oxygen Delivery Method): nasal cannula  O2 Flow (L/min): 2      PHYSICAL EXAM:  GENERAL: NAD, well-groomed, well-developed  HEENT : Conjuntivae  clear sclerae anicteric  NECK: Supple, No JVD, Normal thyroid  NERVOUS SYSTEM:  Alert oriented   no  focal  deficits;   CHEST/LUNG: Clear    HEART: Regular rate and rhythm;  ABDOMEN: Soft, Nontender, Nondistended; Bowel sounds present  EXTREMITIES:  no  edema no  tenderness  SKIN: No rashes   LABS:                        8.2    4.18  )-----------( 311      ( 05 Sep 2022 07:30 )             27.3     09-06    138  |  106  |  15  ----------------------------<  94  3.7   |  28  |  0.71    Ca    7.8<L>      06 Sep 2022 07:40      t< from: TTE Echo Complete w/o Contrast w/ Doppler (09.05.22 @ 13:41) >  Summary:   1. Left ventricular ejection fraction, by visual estimation, is 60 to   65%.   2. Mild mitral annular calcification.   3. No evidence of mitral valve regurgitation.   4. Mild tricuspid regurgitation.   5. Moderate to severe aortic regurgitation.   6. Sclerotic aortic valve with normal opening.   7. Mild pulmonic valve regurgitation.    < end of copied text >      CAPILLARY BLOOD GLUCOSE          RADIOLOGY & ADDITIONAL TESTS:    Imaging reports  Personally Reviewed:  [ ] YES  [ ] NO    Consultant(s) Notes Reviewed:  [ x] YES  [ ] NO    Care Discussed with Consultants/Other Providers [x ] YES  [ ] NO  Problem/Plan - 1:  ·  Problem: Anemia. colon  mass  ·  Plan:   s/p  transfusion 2 UPRBCs  s/p colonoscopy    s/p surgical  evalaution   check  pathology  report  for  oncology  evaluation     Problem/Plan - 2:  ·  Problem: Anxiety.   ·  Plan: xanx prn.    Problem/Plan - 3:  ·  Problem: History of bladder cancer.   ·  Plan: u/a  observation.    knee  pain  OA  tylenol lidocaine  patch  for  trnasfusion  TTE  preoperatively  as  per  Alissa  Problem/Plan - 1:  ·  Problem: Anemia. colon  mass  ·  Plan:   s/p  transfusion 2 UPRBCs  s/p colonoscopy    s/p surgical  evalaution   check  pathology  report  for  oncology  evaluation     Problem/Plan - 2:  ·  Problem: Anxiety.   ·  Plan: xanx prn.    Problem/Plan - 3:  ·  Problem: History of bladder cancer.   ·  Plan: u/a  observation.    knee  pain  OA  tylenol lidocaine  patch  moderate  to  severe  AI  with  normal  L  ventricular  function  nop,al R+EF  check  repeat  H/H have  called  cardiology  evaluation   MEDICALLY  CLEARED  UNLESS  HB  <9 OR  CARDIAC  OBJECTION

## 2022-09-06 NOTE — CONSULT NOTE ADULT - ASSESSMENT
87 yo female with hx of Hyperlipidemia admitted with severe anemia HB 5.0 as outpatient and here in ED 6.0,  SOB and LOZANO  otherwise ROS is negative ,  CT scan r colonic mass ? neoplastic process 
86 year old female with hx of bladder ca; admitted with anemia with Hg 5 -> 27-30 s/p multiple transfusions.  Found to have colonic mass; for OR today for R hemicolectomy.   Echo 9/5/22:  LVEF 60% with moderate to severe AI  EKG: sinus 84bpm  She states that shes known about the "heart valve leak" for many years; nothing done bc she was asymptomatic.  She continues to deny chest pain/palpitations/syncope; +dyspnea only during this admission w/ severe anemia -> now resolved with stable HCT.    She is at intermediate cardiovascular risk for an intermediate risk procedure.  Currently HD stable; no evidence of ischemia/arrhythmia/HF.  Would avoid large fluid shifts while in the OR; freq lung exam post op with low threshold for prn lasix if O2sats <94% or rales on exam.  Will likely require eval for AVR once surgical-GI/heme-onc issues resolved.  Will follow with you.

## 2022-09-06 NOTE — CONSULT NOTE ADULT - SUBJECTIVE AND OBJECTIVE BOX
CARDIOLOGY CONSULT NOTE    Patient is a 86y Female with a known history of :  Anemia [D64.9]    Anxiety [F41.9]    History of bladder cancer [Z85.51]    Mass of colon [K63.89]      HPI:  86 year old female with hx of bladder ca; admitted with anemia with Hg 5 -> 27-30 s/p multiple transfusions.  Found to have colonic mass; for OR today for R hemicolectomy.   Echo 9/5/22:  LVEF 60% with moderate to severe AI      REVIEW OF SYSTEMS:  CONSTITUTIONAL: No fever, weight loss, or fatigue  EYES: No eye pain, visual disturbances, or discharge  ENMT:  No difficulty hearing, tinnitus, vertigo; No sinus or throat pain  NECK: No pain or stiffness  BREASTS: No pain, masses, or nipple discharge  RESPIRATORY: No cough, wheezing, chills or hemoptysis; No shortness of breath  CARDIOVASCULAR: No chest pain, palpitations, dizziness, or leg swelling  GASTROINTESTINAL: No abdominal or epigastric pain. No nausea, vomiting, or hematemesis; No diarrhea or constipation. No melena or hematochezia.  GENITOURINARY: No dysuria, frequency, hematuria, or incontinence  NEUROLOGICAL: No headaches, memory loss, loss of strength, numbness, or tremors  SKIN: No itching, burning, rashes, or lesions   LYMPH NODES: No enlarged glands  ENDOCRINE: No heat or cold intolerance; No hair loss  MUSCULOSKELETAL: No joint pain or swelling; No muscle, back, or extremity pain  PSYCHIATRIC: No depression, anxiety, mood swings, or difficulty sleeping  HEME/LYMPH: No easy bruising, or bleeding gums  ALLERGY AND IMMUNOLOGIC: No hives or eczema    MEDICATIONS  (STANDING):  ALPRAZolam 0.25 milliGRAM(s) Oral two times a day  lidocaine   4% Patch 1 Patch Transdermal daily  pantoprazole    Tablet 40 milliGRAM(s) Oral before breakfast  sodium chloride 0.9%. 1000 milliLiter(s) (75 mL/Hr) IV Continuous <Continuous>    MEDICATIONS  (PRN):  acetaminophen     Tablet .. 650 milliGRAM(s) Oral every 6 hours PRN Mild Pain (1 - 3)  artificial  tears Solution 1 Drop(s) Right EYE every 2 hours PRN Dry Eyes  hydrALAZINE Injectable 10 milliGRAM(s) IV Push every 4 hours PRN htn      ALLERGIES: No Known Allergies      FAMILY HISTORY:      PHYSICAL EXAMINATION:  -----------------------------  T(C): 37.1 (09-06-22 @ 05:16), Max: 37.1 (09-05-22 @ 21:54)  HR: 91 (09-06-22 @ 05:16) (82 - 99)  BP: 145/82 (09-06-22 @ 05:16) (145/82 - 159/84)  RR: 18 (09-06-22 @ 05:16) (17 - 18)  SpO2: 98% (09-06-22 @ 05:16) (94% - 100%)      Constitutional: well developed, normal appearance, well groomed, well nourished, no deformities and no acute distress.   Eyes: the conjunctiva exhibited no abnormalities and the eyelids demonstrated no xanthelasmas.   HEENT: normal oral mucosa, no oral pallor and no oral cyanosis.   Neck: normal jugular venous A waves present, normal jugular venous V waves present and no jugular venous kaur A waves.   Pulmonary: no respiratory distress, normal respiratory rhythm and effort, no accessory muscle use and lungs were clear to auscultation bilaterally.   Cardiovascular: heart rate and rhythm were normal, normal S1 and S2 and no murmur, gallop, rub, heave or thrill are present.   Abdomen: soft, non-tender, no hepato-splenomegaly and no abdominal mass palpated.   Musculoskeletal: the gait could not be assessed..   Extremities: no clubbing of the fingernails, no localized cyanosis, no petechial hemorrhages and no ischemic changes.   Skin: normal skin color and pigmentation, no rash, no venous stasis, no skin lesions, no skin ulcer and no xanthoma was observed.   Psychiatric: oriented to person, place, and time, the affect was normal, the mood was normal and not feeling anxious.       LABS:   --------  09-06    138  |  106  |  15  ----------------------------<  94  3.7   |  28  |  0.71    Ca    7.8<L>      06 Sep 2022 07:40  Phos  3.6     09-06  Mg     2.2     09-06                           8.2    4.18  )-----------( 311      ( 05 Sep 2022 07:30 )             27.3                 RADIOLOGY:  -----------------    ECG:         < from: TTE Echo Complete w/o Contrast w/ Doppler (09.05.22 @ 13:41) >  Summary:   1. Left ventricular ejection fraction, by visual estimation, is 60 to   65%.   2. Mild mitral annular calcification.   3. No evidence of mitral valve regurgitation.   4. Mild tricuspid regurgitation.   5. Moderate to severe aortic regurgitation.   6. Sclerotic aortic valve with normal opening.   7. Mild pulmonic valve regurgitation.      [id number] Dk Levin MD, [facility]  Electronically signed on 9/5/2022 at 8:53:48 PM      < end of copied text >   CARDIOLOGY CONSULT NOTE    Patient is a 86y Female with a known history of :  Anemia [D64.9]    Anxiety [F41.9]    History of bladder cancer [Z85.51]    Mass of colon [K63.89]      HPI:  86 year old female with hx of bladder ca; admitted with anemia with Hg 5 -> 27-30 s/p multiple transfusions.  Found to have colonic mass; for OR today for R hemicolectomy.   Echo 9/5/22:  LVEF 60% with moderate to severe AI  EKG: sinus 84bpm  She states that shes known about the "heart valve leak" for many years; nothing done bc she was asymptomatic.  She continues to deny chest pain/palpitations/syncope; +dyspnea only this admission w/ severe anemia.      REVIEW OF SYSTEMS:  CONSTITUTIONAL: No fever, weight loss, or fatigue  EYES: No eye pain, visual disturbances, or discharge  ENMT:  No difficulty hearing, tinnitus, vertigo; No sinus or throat pain  NECK: No pain or stiffness  BREASTS: No pain, masses, or nipple discharge  RESPIRATORY: No cough, wheezing, chills or hemoptysis; No shortness of breath  CARDIOVASCULAR: No chest pain, palpitations, dizziness, or leg swelling  GASTROINTESTINAL: No abdominal or epigastric pain. No nausea, vomiting, or hematemesis; No diarrhea or constipation. No melena or hematochezia.  GENITOURINARY: No dysuria, frequency, hematuria, or incontinence  NEUROLOGICAL: No headaches, memory loss, loss of strength, numbness, or tremors  SKIN: No itching, burning, rashes, or lesions   LYMPH NODES: No enlarged glands  ENDOCRINE: No heat or cold intolerance; No hair loss  MUSCULOSKELETAL: No joint pain or swelling; No muscle, back, or extremity pain  PSYCHIATRIC: No depression, anxiety, mood swings, or difficulty sleeping  HEME/LYMPH: No easy bruising, or bleeding gums  ALLERGY AND IMMUNOLOGIC: No hives or eczema    MEDICATIONS  (STANDING):  ALPRAZolam 0.25 milliGRAM(s) Oral two times a day  lidocaine   4% Patch 1 Patch Transdermal daily  pantoprazole    Tablet 40 milliGRAM(s) Oral before breakfast  sodium chloride 0.9%. 1000 milliLiter(s) (75 mL/Hr) IV Continuous <Continuous>    MEDICATIONS  (PRN):  acetaminophen     Tablet .. 650 milliGRAM(s) Oral every 6 hours PRN Mild Pain (1 - 3)  artificial  tears Solution 1 Drop(s) Right EYE every 2 hours PRN Dry Eyes  hydrALAZINE Injectable 10 milliGRAM(s) IV Push every 4 hours PRN htn      ALLERGIES: No Known Allergies      FAMILY HISTORY:      PHYSICAL EXAMINATION:  -----------------------------  T(C): 37.1 (09-06-22 @ 05:16), Max: 37.1 (09-05-22 @ 21:54)  HR: 91 (09-06-22 @ 05:16) (82 - 99)  BP: 145/82 (09-06-22 @ 05:16) (145/82 - 159/84)  RR: 18 (09-06-22 @ 05:16) (17 - 18)  SpO2: 98% (09-06-22 @ 05:16) (94% - 100%)      Constitutional: well developed, normal appearance, well groomed, well nourished, no deformities and no acute distress.   Eyes: the conjunctiva exhibited no abnormalities and the eyelids demonstrated no xanthelasmas.   HEENT: normal oral mucosa, no oral pallor and no oral cyanosis.   Neck: normal jugular venous A waves present, normal jugular venous V waves present and no jugular venous kaur A waves.   Pulmonary: no respiratory distress, normal respiratory rhythm and effort, no accessory muscle use and lungs were clear to auscultation bilaterally.   Cardiovascular: heart rate and rhythm were normal; 2/6 SM  Abdomen: soft, non-tender, no hepato-splenomegaly and no abdominal mass palpated.   Musculoskeletal: the gait could not be assessed..   Extremities: no clubbing of the fingernails, no localized cyanosis, no petechial hemorrhages and no ischemic changes.   Skin: normal skin color and pigmentation, no rash, no venous stasis, no skin lesions, no skin ulcer and no xanthoma was observed.   Psychiatric: oriented to person, place, and time, the affect was normal, the mood was normal and not feeling anxious.       LABS:   --------  09-06    138  |  106  |  15  ----------------------------<  94  3.7   |  28  |  0.71    Ca    7.8<L>      06 Sep 2022 07:40  Phos  3.6     09-06  Mg     2.2     09-06                           8.2    4.18  )-----------( 311      ( 05 Sep 2022 07:30 )             27.3                 RADIOLOGY:  -----------------      < from: TTE Echo Complete w/o Contrast w/ Doppler (09.05.22 @ 13:41) >  Summary:   1. Left ventricular ejection fraction, by visual estimation, is 60 to   65%.   2. Mild mitral annular calcification.   3. No evidence of mitral valve regurgitation.   4. Mild tricuspid regurgitation.   5. Moderate to severe aortic regurgitation.   6. Sclerotic aortic valve with normal opening.   7. Mild pulmonic valve regurgitation.      [id number] Dk Levin MD, formerly [facility]  Electronically signed on 9/5/2022 at 8:53:48 PM      < end of copied text >

## 2022-09-06 NOTE — PROGRESS NOTE ADULT - SUBJECTIVE AND OBJECTIVE BOX
Team Surgery Preop Note    Patient is a 86y old  Female who presents with a chief complaint of anemia  gamez (06 Sep 2022 08:52)     Diagnosis: colon mass  Procedure: right hemicolectomy  Surgeon: Dr. Lu                          9.9    4.41  )-----------( 309      ( 06 Sep 2022 09:40 )             31.7     09-06    138  |  106  |  15  ----------------------------<  94  3.7   |  28  |  0.71    Ca    7.8<L>      06 Sep 2022 07:40  Phos  3.6     09-06  Mg     2.2     09-06      PT/INR - ( 06 Sep 2022 09:40 )   PT: 12.5 sec;   INR: 1.05 ratio         PTT - ( 06 Sep 2022 09:40 )  PTT:26.0 sec    Flu With COVID-19 By BHUMI (09.06.22 @ 06:28)   SARS-CoV-2 Result: NotDetec: EUA/IVD   Influenza A Result: NotDetec: EUA/IVD   Influenza B Result: NotDetec: EUA/IVD   ABO RH Interpretation: A POS (09.05.22 @ 09:40)   Antibody Screen: NEG (09.05.22 @ 09:40)   < from: CT Abdomen and Pelvis w/ IV Cont (08.30.22 @ 15:17) >  Irregular wall thickening of colon extending from the cecum through the   proximal ascending colon and across to the proximal transversecolon,   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    Fluid distention of the appendix secondary colonic mass obstructing   appendiceal orifice.    Subcentimeter liver lesions too small to characterize, likely cysts.    Abrupt termination of the CBD, possibly related to intraluminal debris or   mass. Consider further evaluation with MRI/MRCP.    < from: CT Chest No Cont (09.02.22 @ 12:02) >  IMPRESSION:    Scattered pulmonary nodules measuring upto 4 mm, which are nonspecific.   Surveillance is recommended.    < from: MR MRCP No Cont (09.02.22 @ 17:00) >  IMPRESSION:  Limited examination.  Normal caliber biliary tree.  Apparent periampullary duodenal diverticulum    < from: TTE Echo Complete w/o Contrast w/ Doppler (09.05.22 @ 13:41) >  Summary:   1. Left ventricular ejection fraction, by visual estimation, is 60 to   65%.   2. Mild mitral annular calcification.   3. No evidence of mitral valve regurgitation.   4. Mild tricuspid regurgitation.   5. Moderate to severe aortic regurgitation.   6. Sclerotic aortic valve with normal opening.   7. Mild pulmonic valve regurgitation.      [id number] Dk Levin MD, [facility]  Electronically signed on 9/5/2022 at 8:53:48 PM

## 2022-09-06 NOTE — BRIEF OPERATIVE NOTE - OPERATION/FINDINGS
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 Intracorporeal anastomosis performed (isoperistaltic ileo-colonic).

## 2022-09-06 NOTE — PROGRESS NOTE ADULT - SUBJECTIVE AND OBJECTIVE BOX
Post-op check    S/P laparoscopic right hemicolectomy POD#0  Pt seen and examined at bedside. Admits to incisional pain well controlled with medication. Denies chest pain, shortness of breath, nausea/ vomiting, and dizziness.     Vital Signs Last 24 Hrs  T(F): 97.5 (09-06-22 @ 21:48), Max: 98.8 (09-05-22 @ 23:38)  HR: 73 (09-06-22 @ 21:48)  BP: 141/73 (09-06-22 @ 21:48)  RR: 17 (09-06-22 @ 21:48)  SpO2: 97% (09-06-22 @ 21:48)  POCT Blood Glucose.: 175 mg/dL (06 Sep 2022 19:38)      CONSTITUTIONAL: Alert, NAD  RESPIRATORY: Clear to auscultation bilaterally, respirations nonlabored  CARDIOVASCULAR: S1S2, Regular rate and rhythm  GASTROINTESTINAL: Dressing and incisions C/D/I, Nondistended, + Bowel sounds, soft, Appropriate incisional tenderness  MUSCULOSKELETAL:  no calf tenderness, No edema, intermittent compression devices in place bilaterally      Assessment: 86F S/P laparoscopic right hemicolectomy POD#0    Plan:  - local wound care  - DVT prophylaxis, Incentive Spirometer, OOB, Ambulating, pain control, PT  - Continue post-op antibiotics   - f/u labs   - continue current management per medicine/cardiology  - will discuss with surgical attending

## 2022-09-07 LAB
ANION GAP SERPL CALC-SCNC: 9 MMOL/L — SIGNIFICANT CHANGE UP (ref 5–17)
ANISOCYTOSIS BLD QL: SLIGHT — SIGNIFICANT CHANGE UP
BASOPHILS # BLD AUTO: 0.02 K/UL — SIGNIFICANT CHANGE UP (ref 0–0.2)
BASOPHILS NFR BLD AUTO: 0.2 % — SIGNIFICANT CHANGE UP (ref 0–2)
BUN SERPL-MCNC: 13 MG/DL — SIGNIFICANT CHANGE UP (ref 7–23)
CALCIUM SERPL-MCNC: 8.2 MG/DL — LOW (ref 8.5–10.1)
CHLORIDE SERPL-SCNC: 102 MMOL/L — SIGNIFICANT CHANGE UP (ref 96–108)
CO2 SERPL-SCNC: 26 MMOL/L — SIGNIFICANT CHANGE UP (ref 22–31)
CREAT SERPL-MCNC: 0.86 MG/DL — SIGNIFICANT CHANGE UP (ref 0.5–1.3)
EGFR: 67 ML/MIN/1.73M2 — SIGNIFICANT CHANGE UP
EOSINOPHIL # BLD AUTO: 0 K/UL — SIGNIFICANT CHANGE UP (ref 0–0.5)
EOSINOPHIL NFR BLD AUTO: 0 % — SIGNIFICANT CHANGE UP (ref 0–6)
GLUCOSE SERPL-MCNC: 145 MG/DL — HIGH (ref 70–99)
HCT VFR BLD CALC: 29.4 % — LOW (ref 34.5–45)
HGB BLD-MCNC: 9 G/DL — LOW (ref 11.5–15.5)
HYPOCHROMIA BLD QL: SIGNIFICANT CHANGE UP
HYPOSEGMENTATION: PRESENT — SIGNIFICANT CHANGE UP
IMM GRANULOCYTES NFR BLD AUTO: 0.3 % — SIGNIFICANT CHANGE UP (ref 0–1.5)
LG PLATELETS BLD QL AUTO: SIGNIFICANT CHANGE UP
LYMPHOCYTES # BLD AUTO: 0.6 K/UL — LOW (ref 1–3.3)
LYMPHOCYTES # BLD AUTO: 6.3 % — LOW (ref 13–44)
MAGNESIUM SERPL-MCNC: 1.8 MG/DL — SIGNIFICANT CHANGE UP (ref 1.6–2.6)
MANUAL SMEAR VERIFICATION: SIGNIFICANT CHANGE UP
MCHC RBC-ENTMCNC: 23.3 PG — LOW (ref 27–34)
MCHC RBC-ENTMCNC: 30.6 G/DL — LOW (ref 32–36)
MCV RBC AUTO: 76 FL — LOW (ref 80–100)
MICROCYTES BLD QL: SIGNIFICANT CHANGE UP
MONOCYTES # BLD AUTO: 0.41 K/UL — SIGNIFICANT CHANGE UP (ref 0–0.9)
MONOCYTES NFR BLD AUTO: 4.3 % — SIGNIFICANT CHANGE UP (ref 2–14)
NEUTROPHILS # BLD AUTO: 8.49 K/UL — HIGH (ref 1.8–7.4)
NEUTROPHILS NFR BLD AUTO: 88.9 % — HIGH (ref 43–77)
NRBC # BLD: 0 /100 WBCS — SIGNIFICANT CHANGE UP (ref 0–0)
OVALOCYTES BLD QL SMEAR: SLIGHT — SIGNIFICANT CHANGE UP
PHOSPHATE SERPL-MCNC: 4.5 MG/DL — SIGNIFICANT CHANGE UP (ref 2.5–4.5)
PLAT MORPH BLD: NORMAL — SIGNIFICANT CHANGE UP
PLATELET # BLD AUTO: 262 K/UL — SIGNIFICANT CHANGE UP (ref 150–400)
POTASSIUM SERPL-MCNC: 3.7 MMOL/L — SIGNIFICANT CHANGE UP (ref 3.5–5.3)
POTASSIUM SERPL-SCNC: 3.7 MMOL/L — SIGNIFICANT CHANGE UP (ref 3.5–5.3)
RBC # BLD: 3.87 M/UL — SIGNIFICANT CHANGE UP (ref 3.8–5.2)
RBC # FLD: 24.4 % — HIGH (ref 10.3–14.5)
RBC BLD AUTO: ABNORMAL
SODIUM SERPL-SCNC: 137 MMOL/L — SIGNIFICANT CHANGE UP (ref 135–145)
WBC # BLD: 9.55 K/UL — SIGNIFICANT CHANGE UP (ref 3.8–10.5)
WBC # FLD AUTO: 9.55 K/UL — SIGNIFICANT CHANGE UP (ref 3.8–10.5)

## 2022-09-07 PROCEDURE — 99233 SBSQ HOSP IP/OBS HIGH 50: CPT

## 2022-09-07 RX ORDER — TRAMADOL HYDROCHLORIDE 50 MG/1
25 TABLET ORAL EVERY 4 HOURS
Refills: 0 | Status: DISCONTINUED | OUTPATIENT
Start: 2022-09-07 | End: 2022-09-10

## 2022-09-07 RX ORDER — MAGNESIUM SULFATE 500 MG/ML
1 VIAL (ML) INJECTION ONCE
Refills: 0 | Status: COMPLETED | OUTPATIENT
Start: 2022-09-07 | End: 2022-09-07

## 2022-09-07 RX ORDER — ACETAMINOPHEN 500 MG
975 TABLET ORAL EVERY 6 HOURS
Refills: 0 | Status: DISCONTINUED | OUTPATIENT
Start: 2022-09-07 | End: 2022-09-10

## 2022-09-07 RX ORDER — DEXTROSE MONOHYDRATE, SODIUM CHLORIDE, AND POTASSIUM CHLORIDE 50; .745; 4.5 G/1000ML; G/1000ML; G/1000ML
1000 INJECTION, SOLUTION INTRAVENOUS
Refills: 0 | Status: DISCONTINUED | OUTPATIENT
Start: 2022-09-07 | End: 2022-09-10

## 2022-09-07 RX ORDER — POTASSIUM CHLORIDE 20 MEQ
20 PACKET (EA) ORAL ONCE
Refills: 0 | Status: COMPLETED | OUTPATIENT
Start: 2022-09-07 | End: 2022-09-07

## 2022-09-07 RX ADMIN — TRAMADOL HYDROCHLORIDE 25 MILLIGRAM(S): 50 TABLET ORAL at 23:08

## 2022-09-07 RX ADMIN — Medication 15 MILLIGRAM(S): at 17:30

## 2022-09-07 RX ADMIN — Medication 400 MILLIGRAM(S): at 02:42

## 2022-09-07 RX ADMIN — Medication 100 MILLIGRAM(S): at 05:21

## 2022-09-07 RX ADMIN — Medication 15 MILLIGRAM(S): at 18:18

## 2022-09-07 RX ADMIN — Medication 15 MILLIGRAM(S): at 11:52

## 2022-09-07 RX ADMIN — DEXTROSE MONOHYDRATE, SODIUM CHLORIDE, AND POTASSIUM CHLORIDE 50 MILLILITER(S): 50; .745; 4.5 INJECTION, SOLUTION INTRAVENOUS at 11:52

## 2022-09-07 RX ADMIN — Medication 20 MILLIEQUIVALENT(S): at 11:52

## 2022-09-07 RX ADMIN — Medication 15 MILLIGRAM(S): at 06:33

## 2022-09-07 RX ADMIN — Medication 25 GRAM(S): at 11:52

## 2022-09-07 RX ADMIN — TRAMADOL HYDROCHLORIDE 25 MILLIGRAM(S): 50 TABLET ORAL at 22:08

## 2022-09-07 RX ADMIN — Medication 15 MILLIGRAM(S): at 05:21

## 2022-09-07 RX ADMIN — Medication 1000 MILLIGRAM(S): at 03:24

## 2022-09-07 RX ADMIN — Medication 400 MILLIGRAM(S): at 08:00

## 2022-09-07 RX ADMIN — ENOXAPARIN SODIUM 40 MILLIGRAM(S): 100 INJECTION SUBCUTANEOUS at 05:22

## 2022-09-07 RX ADMIN — Medication 975 MILLIGRAM(S): at 14:44

## 2022-09-07 RX ADMIN — Medication 975 MILLIGRAM(S): at 15:44

## 2022-09-07 RX ADMIN — Medication 15 MILLIGRAM(S): at 12:20

## 2022-09-07 RX ADMIN — Medication 100 MILLIGRAM(S): at 14:43

## 2022-09-07 NOTE — PROGRESS NOTE ADULT - SUBJECTIVE AND OBJECTIVE BOX
INTERVAL HPI/OVERNIGHT EVENTS:    s/p rt  hemicolectomy     REVIEW OF SYSTEMS:  CONSTITUTIONAL:  no  complaints wants  to  eat    NECK: No pain or stiffnes  RESPIRATORY: No SOB   CARDIOVASCULAR: No chest pain, palpitations, dizziness,   GASTROINTESTINAL: No abdominal pain. No nausea, vomiting,   NEUROLOGICAL: No headaches, no  blurry  vision no  dizziness  SKIN: No itching,   MUSCULOSKELETAL: No pain    MEDICATION:  ceFAZolin   IVPB 1000 milliGRAM(s) IV Intermittent every 8 hours  enoxaparin Injectable 40 milliGRAM(s) SubCutaneous every 24 hours  famotidine Injectable 20 milliGRAM(s) IV Push every 12 hours PRN  gabapentin 100 milliGRAM(s) Oral every 8 hours PRN  ketorolac   Injectable 15 milliGRAM(s) IV Push every 8 hours  lactated ringers. 1000 milliLiter(s) IV Continuous <Continuous>  morphine  - Injectable 2 milliGRAM(s) IV Push every 6 hours PRN  ondansetron Injectable 4 milliGRAM(s) IV Push every 6 hours PRN    Vital Signs Last 24 Hrs  T(C): 36.4 (07 Sep 2022 06:45), Max: 36.8 (06 Sep 2022 11:51)  T(F): 97.5 (07 Sep 2022 06:45), Max: 98.3 (06 Sep 2022 11:51)  HR: 77 (07 Sep 2022 06:45) (65 - 98)  BP: 112/64 (07 Sep 2022 06:45) (112/64 - 170/87)  BP(mean): --  RR: 16 (07 Sep 2022 06:45) (12 - 22)  SpO2: 97% (07 Sep 2022 06:45) (95% - 99%)    Parameters below as of 07 Sep 2022 06:45  Patient On (Oxygen Delivery Method): nasal cannula        PHYSICAL EXAM:  GENERAL: NAD, well-groomed, well-developed  HEENT : Conjuntivae  clear sclerae anicteric  NECK: Supple, No JVD, Normal thyroid  NERVOUS SYSTEM:  Alert oriented   no  focal  deficits;   CHEST/LUNG: Clear    HEART: Regular rate and rhythm; No murmurs, rubs, or gallops  ABDOMEN: Soft, mild  diffuse tenderness , Nondistended;  NO Bowel sounds present  EXTREMITIES:  no  edema no  tenderness  SKIN: No rashes   LABS:                        9.0    9.55  )-----------( 262      ( 07 Sep 2022 06:12 )             29.4     09-07    137  |  102  |  13  ----------------------------<  145<H>  3.7   |  26  |  0.86    Ca    8.2<L>      07 Sep 2022 06:12  Phos  4.5     09-07  Mg     1.8     09-07      PT/INR - ( 06 Sep 2022 09:40 )   PT: 12.5 sec;   INR: 1.05 ratio         PTT - ( 06 Sep 2022 09:40 )  PTT:26.0 sec    CAPILLARY BLOOD GLUCOSE      POCT Blood Glucose.: 175 mg/dL (06 Sep 2022 19:38)      RADIOLOGY & ADDITIONAL TESTS:    Imaging reports  Personally Reviewed:  [ ] YES  [ ] NO    Consultant(s) Notes Reviewed:  [x ] YES  [ ] NO    Care Discussed with Consultants/Other Providers [x ] YES  [ ] NO  Problem/Plan - 1:  ·  Problem: Anemia. colon  mass  ·  Plan:   s/p  transfusion 2 UPRBCs  s/p colonoscopy    s/p surgical  evalaution   check  pathology  report  for  oncology  evaluation     Problem/Plan - 2:  ·  Problem: Anxiety.   ·  Plan: xanx prn.    Problem/Plan - 3:  ·  Problem: History of bladder cancer.   ·  Plan: u/a  observation.    knee  pain  OA  tylenol lidocaine  patch  for  trnasfusion  TTE  preoperatively  as  per  Alissa  Problem/Plan - 1:  ·  Problem: Anemia. colon  mass  ·  Plan:    s/p colonoscopy     check  pathology  report  for  oncology  evaluation     Problem/Plan - 2:  ·  Problem: Anxiety.   ·  Plan: xanx prn.    Problem/Plan - 3:  ·  Problem: History of bladder cancer.   ·  Plan: u/a  observation.    knee  pain  OA  tylenol lidocaine  patch  moderate  to  severe  AI  with  normal  L  ventricular  function  nop,al R+EF  check  repeat  H/H have  called  cardiology  evaluation      INTERVAL HPI/OVERNIGHT EVENTS:    s/p rt  hemicolectomy     REVIEW OF SYSTEMS:  CONSTITUTIONAL:  no  complaints wants  to  eat    NECK: No pain or stiffnes  RESPIRATORY: No SOB   CARDIOVASCULAR: No chest pain, palpitations, dizziness,   GASTROINTESTINAL: No abdominal pain. No nausea, vomiting,   NEUROLOGICAL: No headaches, no  blurry  vision no  dizziness  SKIN: No itching,   MUSCULOSKELETAL: No pain    MEDICATION:  ceFAZolin   IVPB 1000 milliGRAM(s) IV Intermittent every 8 hours  enoxaparin Injectable 40 milliGRAM(s) SubCutaneous every 24 hours  famotidine Injectable 20 milliGRAM(s) IV Push every 12 hours PRN  gabapentin 100 milliGRAM(s) Oral every 8 hours PRN  ketorolac   Injectable 15 milliGRAM(s) IV Push every 8 hours  lactated ringers. 1000 milliLiter(s) IV Continuous <Continuous>  morphine  - Injectable 2 milliGRAM(s) IV Push every 6 hours PRN  ondansetron Injectable 4 milliGRAM(s) IV Push every 6 hours PRN    Vital Signs Last 24 Hrs  T(C): 36.4 (07 Sep 2022 06:45), Max: 36.8 (06 Sep 2022 11:51)  T(F): 97.5 (07 Sep 2022 06:45), Max: 98.3 (06 Sep 2022 11:51)  HR: 77 (07 Sep 2022 06:45) (65 - 98)  BP: 112/64 (07 Sep 2022 06:45) (112/64 - 170/87)  BP(mean): --  RR: 16 (07 Sep 2022 06:45) (12 - 22)  SpO2: 97% (07 Sep 2022 06:45) (95% - 99%)    Parameters below as of 07 Sep 2022 06:45  Patient On (Oxygen Delivery Method): nasal cannula        PHYSICAL EXAM:  GENERAL: NAD, well-groomed, well-developed  HEENT : Conjuntivae  clear sclerae anicteric  NECK: Supple, No JVD, Normal thyroid  NERVOUS SYSTEM:  Alert oriented   no  focal  deficits;   CHEST/LUNG: Clear    HEART: Regular rate and rhythm; No murmurs, rubs, or gallops  ABDOMEN: Soft, mild  diffuse tenderness , Nondistended;  NO Bowel sounds present  EXTREMITIES:  no  edema no  tenderness  SKIN: No rashes   LABS:                        9.0    9.55  )-----------( 262      ( 07 Sep 2022 06:12 )             29.4     09-07    137  |  102  |  13  ----------------------------<  145<H>  3.7   |  26  |  0.86    Ca    8.2<L>      07 Sep 2022 06:12  Phos  4.5     09-07  Mg     1.8     09-07      PT/INR - ( 06 Sep 2022 09:40 )   PT: 12.5 sec;   INR: 1.05 ratio         PTT - ( 06 Sep 2022 09:40 )  PTT:26.0 sec    CAPILLARY BLOOD GLUCOSE      POCT Blood Glucose.: 175 mg/dL (06 Sep 2022 19:38)      RADIOLOGY & ADDITIONAL TESTS:    Imaging reports  Personally Reviewed:  [ ] YES  [ ] NO    Consultant(s) Notes Reviewed:  [x ] YES  [ ] NO    Care Discussed with Consultants/Other Providers [x ] YES  [ ] NO  Problem/Plan - 1:  ·  Problem: Anemia. colon  mass  ·  Plan:   advance  diet  as per  surgery  check  pathology  report  for  oncology  evaluation     Problem/Plan - 2:  ·  Problem: Anxiety.   ·  Plan: xanx prn.    Problem/Plan - 3:  ·  Problem: History of bladder cancer.   ·  Plan: u/a  observation.    knee  pain  OA  tylenol lidocaine  patch  for  trnasfusion  TTE  preoperatively  as  per  Alissa  Problem/Plan - 1:  ·  Problem: Anemia. colon  mass  ·  Plan:    s/p colonoscopy     check  pathology  report  for  oncology  evaluation     Problem/Plan - 2:  ·  Problem: Anxiety.   ·  Plan: xanx prn.    Problem/Plan - 3:  ·  Problem: History of bladder cancer.   ·  Plan: u/a  observation.    knee  pain  OA  tylenol lidocaine  patch  moderate  to  severe  AI  with  normal  L  ventricular  function  nop,al R+EF  check  repeat  H/H have  called  cardiology  evaluation

## 2022-09-07 NOTE — DIETITIAN INITIAL EVALUATION ADULT - PERSON TAUGHT/METHOD
verbal instruction/patient instructed Briefly discussed low fiber diet with pt, to be followed once medically feasible for solids. RD will provide more detailed diet education once diet advanced and pt less anxious/verbal instruction/patient instructed

## 2022-09-07 NOTE — CONSULT NOTE ADULT - SUBJECTIVE AND OBJECTIVE BOX
REASON FOR CONSULTATION:  Colon Cancer  Anemia    INTERVAL HISTORY:  Anemia  Obstructing Colon Cancer      Allergies    No Known Allergies    Intolerances        MEDICATIONS  (STANDING):  acetaminophen     Tablet .. 975 milliGRAM(s) Oral every 6 hours  dextrose 5% + sodium chloride 0.45% with potassium chloride 20 mEq/L 1000 milliLiter(s) (50 mL/Hr) IV Continuous <Continuous>  enoxaparin Injectable 40 milliGRAM(s) SubCutaneous every 24 hours  ketorolac   Injectable 15 milliGRAM(s) IV Push every 6 hours    MEDICATIONS  (PRN):  famotidine Injectable 20 milliGRAM(s) IV Push every 12 hours PRN Dyspepsia  gabapentin 100 milliGRAM(s) Oral every 8 hours PRN pain mild  ondansetron Injectable 4 milliGRAM(s) IV Push every 6 hours PRN Nausea  traMADol 25 milliGRAM(s) Oral every 4 hours PRN Moderate Pain (4 - 6)      Vital Signs Last 24 Hrs  T(C): 36.3 (07 Sep 2022 16:37), Max: 36.9 (07 Sep 2022 10:49)  T(F): 97.3 (07 Sep 2022 16:37), Max: 98.4 (07 Sep 2022 10:49)  HR: 80 (07 Sep 2022 16:37) (65 - 80)  BP: 145/71 (07 Sep 2022 16:37) (112/64 - 170/87)  BP(mean): --  RR: 18 (07 Sep 2022 16:37) (12 - 22)  SpO2: 97% (07 Sep 2022 16:37) (95% - 100%)    Parameters below as of 07 Sep 2022 06:45  Patient On (Oxygen Delivery Method): nasal cannula        PHYSICAL EXAM:  VSS  EYES: EOMI, PERRLA, conjunctiva and sclera clear  CHEST/LUNG: Clear to percussion bilaterally;   HEART: Regular rate and rhythm;   ABDOMEN: Soft, Nontender, Nondistended;   LYMPH: No lymphadenopathy noted.        LABS:                        9.0    9.55  )-----------( 262      ( 07 Sep 2022 06:12 )             29.4     09-07    137  |  102  |  13  ----------------------------<  145<H>  3.7   |  26  |  0.86    Ca    8.2<L>      07 Sep 2022 06:12  Phos  4.5     09-07  Mg     1.8     09-07      PT/INR - ( 06 Sep 2022 09:40 )   PT: 12.5 sec;   INR: 1.05 ratio         PTT - ( 06 Sep 2022 09:40 )  PTT:26.0 sec        RADIOLOGY & ADDITIONAL STUDIES:    PATHOLOGY:

## 2022-09-07 NOTE — PHYSICAL THERAPY INITIAL EVALUATION ADULT - CRITERIA FOR SKILLED THERAPEUTIC INTERVENTIONS
Addended by: Shannen Schwarz on: 10/29/2018 10:37 AM     Modules accepted: Alexa impairments found/functional limitations in following categories/risk reduction/prevention/rehab potential/therapy frequency/predicted duration of therapy intervention/anticipated equipment needs at discharge/anticipated discharge recommendation

## 2022-09-07 NOTE — DIETITIAN NUTRITION RISK NOTIFICATION - TREATMENT: THE FOLLOWING DIET HAS BEEN RECOMMENDED
Diet, Consistent Carbohydrate Clear Liquid (09-06-22 @ 18:43) [Active]    Advance diet as tolerated to Full Liquids to Low Fiber diet + Ensure Plus High Protein x 1/day (350 kcal, 20 g protein) as medically feasible

## 2022-09-07 NOTE — CONSULT NOTE ADULT - PROBLEM SELECTOR RECOMMENDATION 2
- full liquid diet  - consider colonoscopy will discuss with patient and family  - check CEA
Colon Cancer  Right Hemicolectomy  Post Op care  Check Path.  Further recommendations after final Path.

## 2022-09-07 NOTE — PHYSICAL THERAPY INITIAL EVALUATION ADULT - BALANCE TRAINING, PT EVAL
Pt will improve static & dynamic standing balance to Good using no AD to perform ADL, Gait independently  in 1 week

## 2022-09-07 NOTE — PHYSICAL THERAPY INITIAL EVALUATION ADULT - ADDITIONAL COMMENTS
As per pt, she lives alone in a house with 3 steps to enter with b/l rails, once inside there are no other steps, she was independent in all functional mobility using no AD, PTA, she drives.

## 2022-09-07 NOTE — PHYSICAL THERAPY INITIAL EVALUATION ADULT - FUNCTIONAL LIMITATIONS, PT EVAL
Physical Therapy  Patient not seen in therapy.     10:30am: PT orders received, acknowledged, and chart reviewed. Patient currently undergoing ECHO at this time. PT will re-attempt initial evaluation later today as schedule permits.    11:45am: PT re-attempted to see patient following ECHO. Per REECE Titus, patient still undergoing ECHO. PT will re-attempt initial evaluation tomorrow morning as appropriate.                            Therapy procedure time and total treatment time can be found documented on the Time Entry flowsheet   self-care/home management

## 2022-09-07 NOTE — DIETITIAN INITIAL EVALUATION ADULT - PERTINENT MEDS FT
MEDICATIONS  (STANDING):  acetaminophen     Tablet .. 975 milliGRAM(s) Oral every 6 hours  ceFAZolin   IVPB 1000 milliGRAM(s) IV Intermittent every 8 hours  dextrose 5% + sodium chloride 0.45% with potassium chloride 20 mEq/L 1000 milliLiter(s) (50 mL/Hr) IV Continuous <Continuous>  enoxaparin Injectable 40 milliGRAM(s) SubCutaneous every 24 hours  ketorolac   Injectable 15 milliGRAM(s) IV Push every 6 hours    MEDICATIONS  (PRN):  famotidine Injectable 20 milliGRAM(s) IV Push every 12 hours PRN Dyspepsia  gabapentin 100 milliGRAM(s) Oral every 8 hours PRN pain mild  ondansetron Injectable 4 milliGRAM(s) IV Push every 6 hours PRN Nausea  traMADol 25 milliGRAM(s) Oral every 4 hours PRN Moderate Pain (4 - 6)

## 2022-09-07 NOTE — DIETITIAN INITIAL EVALUATION ADULT - PERTINENT LABORATORY DATA
09-07    137  |  102  |  13  ----------------------------<  145<H>  3.7   |  26  |  0.86    Ca    8.2<L>      07 Sep 2022 06:12  Phos  4.5     09-07  Mg     1.8     09-07    POCT Blood Glucose.: 175 mg/dL (09-06-22 @ 19:38)

## 2022-09-07 NOTE — PROGRESS NOTE ADULT - SUBJECTIVE AND OBJECTIVE BOX
Patient is a 86y old  Female who presents with a chief complaint of LOW BLOOD COUNT, ANEMIA     (07 Sep 2022 14:08)      PAST MEDICAL & SURGICAL HISTORY:  Anxiety  Dyslipidemia  Malignant neoplasm of bladder  S/P cystoscopy  History of dilatation and curettage        INTERVAL HISTORY: Pt seen and examined. Doing well post op.   	  MEDICATIONS:  MEDICATIONS  (STANDING):  acetaminophen     Tablet .. 975 milliGRAM(s) Oral every 6 hours  dextrose 5% + sodium chloride 0.45% with potassium chloride 20 mEq/L 1000 milliLiter(s) (50 mL/Hr) IV Continuous <Continuous>  enoxaparin Injectable 40 milliGRAM(s) SubCutaneous every 24 hours  ketorolac   Injectable 15 milliGRAM(s) IV Push every 6 hours    MEDICATIONS  (PRN):  famotidine Injectable 20 milliGRAM(s) IV Push every 12 hours PRN Dyspepsia  gabapentin 100 milliGRAM(s) Oral every 8 hours PRN pain mild  ondansetron Injectable 4 milliGRAM(s) IV Push every 6 hours PRN Nausea  traMADol 25 milliGRAM(s) Oral every 4 hours PRN Moderate Pain (4 - 6)      Vitals:  T(F): 98.2 (09-07-22 @ 14:49), Max: 98.4 (09-07-22 @ 10:49)  HR: 80 (09-07-22 @ 14:49) (65 - 80)  BP: 166/87 (09-07-22 @ 14:49) (112/64 - 170/87)  RR: 17 (09-07-22 @ 14:49) (12 - 22)  SpO2: 100% (09-07-22 @ 14:49) (95% - 100%)  Wt(kg): --    09-06 @ 07:01  -  09-07 @ 07:00  --------------------------------------------------------  IN:  Total IN: 0 mL    OUT:    Voided (mL): 1300 mL  Total OUT: 1300 mL    Total NET: -1300 mL      09-07 @ 07:01  -  09-07 @ 15:10  --------------------------------------------------------  IN:    Oral Fluid: 540 mL  Total IN: 540 mL    OUT:  Total OUT: 0 mL    Total NET: 540 mL          PHYSICAL EXAM:  Neuro: Awake, responsive  CV: S1 S2 RRR  Lungs: CTABL  GI: Soft, BS +, ND, NT, s/p R hemicolectomy  Extremities: No edema     	   ECG: < from: 12 Lead ECG (08.30.22 @ 12:02) >  Diagnosis Line Normal sinus rhythm  Normal ECG  No previous ECGs available  Confirmed by YUMI BENITES MD ([id number]) on 8/31/2022 12:53:59 PM    < end of copied text >    	    RADIOLOGY: < from: CT Chest No Cont (09.02.22 @ 12:02) >    IMPRESSION:    Scattered pulmonary nodules measuring upto 4 mm, which are nonspecific.   Surveillance is recommended.    < end of copied text >      DIAGNOSTIC TESTING:    [ x ] Echocardiogram: < from: TTE Echo Complete w/o Contrast w/ Doppler (09.05.22 @ 13:41) >  Summary:   1. Left ventricular ejection fraction, by visual estimation, is 60 to   65%.   2. Mild mitral annular calcification.   3. No evidence of mitral valve regurgitation.   4. Mild tricuspid regurgitation.   5. Moderate to severe aortic regurgitation.   6. Sclerotic aortic valve with normal opening.   7. Mild pulmonic valve regurgitation.    < end of copied text >      LABS:	 	    CARDIAC MARKERS:      07 Sep 2022 06:12    137    |  102    |  13     ----------------------------<  145    3.7     |  26     |  0.86   06 Sep 2022 07:40    138    |  106    |  15     ----------------------------<  94     3.7     |  28     |  0.71   05 Sep 2022 07:30    141    |  106    |  17     ----------------------------<  96     3.9     |  29     |  0.66     Ca    8.2        07 Sep 2022 06:12  Phos  4.5       07 Sep 2022 06:12  Mg     1.8       07 Sep 2022 06:12                            9.0    9.55  )-----------( 262      ( 07 Sep 2022 06:12 )             29.4 ,                       9.9    4.41  )-----------( 309      ( 06 Sep 2022 09:40 )             31.7 ,                       8.2    4.18  )-----------( 311      ( 05 Sep 2022 07:30 )             27.3       TSH: Thyroid Stimulating Hormone, Serum: 0.808 uU/mL (08.30.22 @ 12:02)      INR: 1.05 ratio (09-06 @ 09:40)

## 2022-09-07 NOTE — DIETITIAN INITIAL EVALUATION ADULT - DIET TYPE
Advance diet as tolerated to Full Liquids to Low Fiber diet + Ensure Plus High Protein x 1/day (350 kcal, 20 g protein) as medically feasible

## 2022-09-07 NOTE — DIETITIAN INITIAL EVALUATION ADULT - OTHER INFO
Pt admitted for anemia and fatigue; found to have R colonic mass, s/p R hemicolectomy 9/6.   Pt lives at home with sister; independent for ADLs; pt does her own food shopping and cooking.   Pt reports good appetite and PO intake PTA. Pt reports that at home she eats healthy diet including fruits and vegetables, and plain foods (no sauces, no spicy food). Pt also likes chicken, sardines, and tuna fish.   Pt currently has decreased appetite due to recent GI surgery; PO intake during admission mostly 26-75%; pt currently on Clear Liquid diet.  Pt reports some nausea.   Pt reports [id number] lbs and admission wt was noted 130 lbs; request current wt to confirm accuracy of admission wt. Pt admitted for anemia and fatigue; found to have R colonic mass, s/p R hemicolectomy 9/6.   Pt lives at home with sister; independent for ADLs; pt does her own food shopping and cooking.   Pt reports good appetite and PO intake PTA. Pt reports that at home she eats healthy diet including fruits and vegetables, and plain foods (no sauces, no spicy food). Pt also likes chicken, sardines, and tuna fish.   Pt currently has decreased appetite due to recent GI surgery; PO intake during admission mostly 26-75%; pt currently on Clear Liquid diet.  Pt reports [id number] lbs and admission wt was noted 130 lbs; request current wt to confirm accuracy of admission wt.

## 2022-09-07 NOTE — DIETITIAN INITIAL EVALUATION ADULT - CALCULATED TO (G/KG)
"HPI     Annual eye exam     + Blurry VA, sofía at near, flashes, floaters, or eye pain.  + dryness OU sometimes "off and on" with some itchy that pt. Feels refresh   helps with x's a couple months.     Refresh gtts BID OU    Last edited by ZACHARY Rodrigues, OD on 4/18/2022  4:48 PM. (History)        ROS     Positive for: Eyes    Negative for: Constitutional, Gastrointestinal, Neurological, Skin,   Genitourinary, Musculoskeletal, HENT, Endocrine, Cardiovascular,   Respiratory, Psychiatric, Allergic/Imm, Heme/Lymph    Last edited by ZACHARY Rodrigues, OD on 4/18/2022  4:57 PM. (History)        Assessment /Plan     For exam results, see Encounter Report.    Nuclear sclerosis, bilateral    Posterior vitreous detachment, bilateral    Bilateral dry eyes    Glaucoma screening    Myopia with astigmatism and presbyopia, bilateral    Family history of macular degeneration      1. Early vis sig NS, not ready for consult   Gave info, cautions driving sofía at night  2. RD precautions given and reviewed. Patient knows to call/ message if any further changes in symptoms occur.  3. Longstanding---recent course of topical fml gave much relief  Currently comfortable on refresh gtts qid--doing well  May need another round of steroid in future   4. Not suspect  5. Updated specs for distance only Rx   Discussed readers for near-- try +1.00 -+1.50 for most tasks  6. Noted in chart  Continue good nutrition / mv or areds vits  No smoking, annual DFE    Discussed and educated patient on current findings /plan.  RTC 1 year, prn if any changes / issues                   "
62.76

## 2022-09-07 NOTE — PHYSICAL THERAPY INITIAL EVALUATION ADULT - TRANSFER TRAINING, PT EVAL
Pt will be able to perform sit to stand, stand pivot transfer using no AD  independently in 2 to 3 days

## 2022-09-07 NOTE — PHYSICAL THERAPY INITIAL EVALUATION ADULT - PLANNED THERAPY INTERVENTIONS, PT EVAL
Pt will be able to negotiate 3 steps with b/l rails independently in 1 week/balance training/bed mobility training/gait training/strengthening/transfer training

## 2022-09-07 NOTE — DIETITIAN INITIAL EVALUATION ADULT - FACTORS AFF FOOD INTAKE
nausea; fatigue; reduction in appetite/change in sense of smell or taste mild nausea; fatigue; reduction in appetite; dislike of clear liquids/change in sense of smell or taste

## 2022-09-07 NOTE — PROGRESS NOTE ADULT - SUBJECTIVE AND OBJECTIVE BOX
SURGERY PROGRESS HPI:  POD#1  Pt seen and examined at bedside. Pain is well controlled on pain medication. Pt denies complaints. Pt tolerating clear liquid diet. Pt denies nausea and vomiting. No BM/flatus. Pt denies chest pain, SOB, dizziness, fever, chills. Did not ambulate yet.     Vital Signs Last 24 Hrs  T(C): 36.6 (07 Sep 2022 02:45), Max: 36.8 (06 Sep 2022 11:51)  T(F): 97.8 (07 Sep 2022 02:45), Max: 98.3 (06 Sep 2022 11:51)  HR: 80 (07 Sep 2022 02:45) (65 - 98)  BP: 146/75 (07 Sep 2022 02:45) (141/73 - 170/87)  BP(mean): --  RR: 18 (07 Sep 2022 02:45) (12 - 22)  SpO2: 97% (07 Sep 2022 02:45) (95% - 99%)    Parameters below as of 07 Sep 2022 02:45  Patient On (Oxygen Delivery Method): nasal cannula        PHYSICAL EXAM:  CONSTITUTIONAL: Alert, NAD  RESPIRATORY: Clear to auscultation bilaterally, respirations nonlabored  CARDIOVASCULAR: S1S2, Regular rate and rhythm  GASTROINTESTINAL: Dressing and incisions C/D/I, Nondistended, + Bowel sounds, soft, Appropriate incisional tenderness  : Lopez indwelling with clear yellow urine   MUSCULOSKELETAL:  no calf tenderness, No edema, intermittent compression devices in place bilaterally    I&O's Detail    05 Sep 2022 07:01  -  06 Sep 2022 07:00  --------------------------------------------------------  IN:    Oral Fluid: 720 mL  Total IN: 720 mL    OUT:  Total OUT: 0 mL    Total NET: 720 mL      LABS:                        9.9    4.41  )-----------( 309      ( 06 Sep 2022 09:40 )             31.7     09-06    138  |  106  |  15  ----------------------------<  94  3.7   |  28  |  0.71    Ca    7.8<L>      06 Sep 2022 07:40  Phos  3.6     09-06  Mg     2.2     09-06      PT/INR - ( 06 Sep 2022 09:40 )   PT: 12.5 sec;   INR: 1.05 ratio      PTT - ( 06 Sep 2022 09:40 )  PTT:26.0 sec      Assessment: 86F S/P laparoscopic right hemicolectomy POD#1    Plan:  - local wound care  - DVT prophylaxis, Incentive Spirometer, OOB, Ambulating, pain control, PT  - Continue post-op antibiotics   - f/u labs   - continue current management per medicine/cardiology  - will discuss with surgical attending

## 2022-09-08 LAB
ANION GAP SERPL CALC-SCNC: 2 MMOL/L — LOW (ref 5–17)
BLD GP AB SCN SERPL QL: SIGNIFICANT CHANGE UP
BUN SERPL-MCNC: 20 MG/DL — SIGNIFICANT CHANGE UP (ref 7–23)
CALCIUM SERPL-MCNC: 7.8 MG/DL — LOW (ref 8.5–10.1)
CHLORIDE SERPL-SCNC: 103 MMOL/L — SIGNIFICANT CHANGE UP (ref 96–108)
CO2 SERPL-SCNC: 30 MMOL/L — SIGNIFICANT CHANGE UP (ref 22–31)
CREAT SERPL-MCNC: 0.96 MG/DL — SIGNIFICANT CHANGE UP (ref 0.5–1.3)
EGFR: 58 ML/MIN/1.73M2 — LOW
GLUCOSE SERPL-MCNC: 93 MG/DL — SIGNIFICANT CHANGE UP (ref 70–99)
HCT VFR BLD CALC: 25.7 % — LOW (ref 34.5–45)
HCT VFR BLD CALC: 29.3 % — LOW (ref 34.5–45)
HCT VFR BLD CALC: 30.2 % — LOW (ref 34.5–45)
HGB BLD-MCNC: 7.8 G/DL — LOW (ref 11.5–15.5)
HGB BLD-MCNC: 8.7 G/DL — LOW (ref 11.5–15.5)
HGB BLD-MCNC: 9.1 G/DL — LOW (ref 11.5–15.5)
MAGNESIUM SERPL-MCNC: 2.3 MG/DL — SIGNIFICANT CHANGE UP (ref 1.6–2.6)
MCHC RBC-ENTMCNC: 23 PG — LOW (ref 27–34)
MCHC RBC-ENTMCNC: 23.1 PG — LOW (ref 27–34)
MCHC RBC-ENTMCNC: 23.3 PG — LOW (ref 27–34)
MCHC RBC-ENTMCNC: 29.7 G/DL — LOW (ref 32–36)
MCHC RBC-ENTMCNC: 30.1 G/DL — LOW (ref 32–36)
MCHC RBC-ENTMCNC: 30.4 G/DL — LOW (ref 32–36)
MCV RBC AUTO: 76 FL — LOW (ref 80–100)
MCV RBC AUTO: 76.5 FL — LOW (ref 80–100)
MCV RBC AUTO: 78.3 FL — LOW (ref 80–100)
NRBC # BLD: 0 /100 WBCS — SIGNIFICANT CHANGE UP (ref 0–0)
PHOSPHATE SERPL-MCNC: 2.5 MG/DL — SIGNIFICANT CHANGE UP (ref 2.5–4.5)
PLATELET # BLD AUTO: 267 K/UL — SIGNIFICANT CHANGE UP (ref 150–400)
PLATELET # BLD AUTO: 312 K/UL — SIGNIFICANT CHANGE UP (ref 150–400)
PLATELET # BLD AUTO: 355 K/UL — SIGNIFICANT CHANGE UP (ref 150–400)
POTASSIUM SERPL-MCNC: 4.6 MMOL/L — SIGNIFICANT CHANGE UP (ref 3.5–5.3)
POTASSIUM SERPL-SCNC: 4.6 MMOL/L — SIGNIFICANT CHANGE UP (ref 3.5–5.3)
RBC # BLD: 3.38 M/UL — LOW (ref 3.8–5.2)
RBC # BLD: 3.74 M/UL — LOW (ref 3.8–5.2)
RBC # BLD: 3.95 M/UL — SIGNIFICANT CHANGE UP (ref 3.8–5.2)
RBC # FLD: 25.3 % — HIGH (ref 10.3–14.5)
RBC # FLD: 25.5 % — HIGH (ref 10.3–14.5)
RBC # FLD: 25.6 % — HIGH (ref 10.3–14.5)
SODIUM SERPL-SCNC: 135 MMOL/L — SIGNIFICANT CHANGE UP (ref 135–145)
WBC # BLD: 7.89 K/UL — SIGNIFICANT CHANGE UP (ref 3.8–10.5)
WBC # BLD: 8.31 K/UL — SIGNIFICANT CHANGE UP (ref 3.8–10.5)
WBC # BLD: 8.78 K/UL — SIGNIFICANT CHANGE UP (ref 3.8–10.5)
WBC # FLD AUTO: 7.89 K/UL — SIGNIFICANT CHANGE UP (ref 3.8–10.5)
WBC # FLD AUTO: 8.31 K/UL — SIGNIFICANT CHANGE UP (ref 3.8–10.5)
WBC # FLD AUTO: 8.78 K/UL — SIGNIFICANT CHANGE UP (ref 3.8–10.5)

## 2022-09-08 PROCEDURE — 99233 SBSQ HOSP IP/OBS HIGH 50: CPT

## 2022-09-08 RX ADMIN — Medication 15 MILLIGRAM(S): at 02:07

## 2022-09-08 RX ADMIN — Medication 15 MILLIGRAM(S): at 12:30

## 2022-09-08 RX ADMIN — Medication 975 MILLIGRAM(S): at 09:38

## 2022-09-08 RX ADMIN — ENOXAPARIN SODIUM 40 MILLIGRAM(S): 100 INJECTION SUBCUTANEOUS at 05:21

## 2022-09-08 RX ADMIN — Medication 975 MILLIGRAM(S): at 14:36

## 2022-09-08 RX ADMIN — Medication 15 MILLIGRAM(S): at 11:27

## 2022-09-08 RX ADMIN — Medication 975 MILLIGRAM(S): at 21:39

## 2022-09-08 RX ADMIN — Medication 15 MILLIGRAM(S): at 05:21

## 2022-09-08 RX ADMIN — Medication 975 MILLIGRAM(S): at 10:38

## 2022-09-08 RX ADMIN — Medication 975 MILLIGRAM(S): at 22:31

## 2022-09-08 RX ADMIN — DEXTROSE MONOHYDRATE, SODIUM CHLORIDE, AND POTASSIUM CHLORIDE 50 MILLILITER(S): 50; .745; 4.5 INJECTION, SOLUTION INTRAVENOUS at 05:20

## 2022-09-08 NOTE — PROGRESS NOTE ADULT - NS PANP COMMENT GEN_ALL_CORE FT
Patient seen and examined  Noted some blood per rectum twice  Denies chest pain, dizziness, or shortness of breath  Ambulating without issues    Awake, alert  Breathing comfortably on room air  Abd is soft ,not distended, appropriately tender  No erythema at incision sites    - follow up repeat CBC at noon

## 2022-09-08 NOTE — PROGRESS NOTE ADULT - SUBJECTIVE AND OBJECTIVE BOX
Patient is a 86y old  Female who presents with a chief complaint of anemia  gamez (08 Sep 2022 11:48)      PAST MEDICAL & SURGICAL HISTORY:  Anxiety  Dyslipidemia  Malignant neoplasm of bladder  S/P cystoscopy  History of dilatation and curettage      INTERVAL HISTORY: Doing well post op.  	  MEDICATIONS:  MEDICATIONS  (STANDING):  acetaminophen     Tablet .. 975 milliGRAM(s) Oral every 6 hours  dextrose 5% + sodium chloride 0.45% with potassium chloride 20 mEq/L 1000 milliLiter(s) (50 mL/Hr) IV Continuous <Continuous>  enoxaparin Injectable 40 milliGRAM(s) SubCutaneous every 24 hours  ketorolac   Injectable 15 milliGRAM(s) IV Push every 6 hours    MEDICATIONS  (PRN):  famotidine Injectable 20 milliGRAM(s) IV Push every 12 hours PRN Dyspepsia  gabapentin 100 milliGRAM(s) Oral every 8 hours PRN pain mild  ondansetron Injectable 4 milliGRAM(s) IV Push every 6 hours PRN Nausea  traMADol 25 milliGRAM(s) Oral every 4 hours PRN Moderate Pain (4 - 6)      Vitals:  T(F): 97.7 (09-08-22 @ 11:09), Max: 98.2 (09-07-22 @ 14:49)  HR: 77 (09-08-22 @ 11:09) (76 - 83)  BP: 131/89 (09-08-22 @ 11:09) (127/62 - 166/87)  RR: 18 (09-08-22 @ 11:09) (17 - 20)  SpO2: 97% (09-08-22 @ 11:09) (96% - 100%)    09-07 @ 07:01  -  09-08 @ 07:00  --------------------------------------------------------  IN:    Oral Fluid: 540 mL  Total IN: 540 mL    OUT:  Total OUT: 0 mL    Total NET: 540 mL        PHYSICAL EXAM:  Neuro: Awake, responsive  CV: S1 S2 RRR  Lungs: CTABL  GI: Soft, BS +, ND, NT, s/p R hemicolectomy  Extremities: No edema    	    ECG: < from: 12 Lead ECG (08.30.22 @ 12:02) >  Diagnosis Line Normal sinus rhythm  Normal ECG  No previous ECGs available  Confirmed by YUMI BENITES MD ([id number]) on 8/31/2022 12:53:59 PM    < end of copied text >    	    RADIOLOGY: < from: CT Chest No Cont (09.02.22 @ 12:02) >    IMPRESSION:    Scattered pulmonary nodules measuring upto 4 mm, which are nonspecific.   Surveillance is recommended.    < end of copied text >      DIAGNOSTIC TESTING:    [ x ] Echocardiogram: < from: TTE Echo Complete w/o Contrast w/ Doppler (09.05.22 @ 13:41) >  Summary:   1. Left ventricular ejection fraction, by visual estimation, is 60 to   65%.   2. Mild mitral annular calcification.   3. No evidence of mitral valve regurgitation.   4. Mild tricuspid regurgitation.   5. Moderate to severe aortic regurgitation.   6. Sclerotic aortic valve with normal opening.   7. Mild pulmonic valve regurgitation.    < end of copied text >    LABS:	 	    CARDIAC MARKERS:          08 Sep 2022 07:39    135    |  103    |  20     ----------------------------<  93     4.6     |  30     |  0.96   07 Sep 2022 06:12    137    |  102    |  13     ----------------------------<  145    3.7     |  26     |  0.86   06 Sep 2022 07:40    138    |  106    |  15     ----------------------------<  94     3.7     |  28     |  0.71     Ca    7.8        08 Sep 2022 07:39  Phos  2.5       08 Sep 2022 07:39  Mg     2.3       08 Sep 2022 07:39                            8.7    8.31  )-----------( 312      ( 08 Sep 2022 12:30 )             29.3 ,                       7.8    7.89  )-----------( 267      ( 08 Sep 2022 07:39 )             25.7 ,                       9.0    9.55  )-----------( 262      ( 07 Sep 2022 06:12 )             29.4 ,                       9.9    4.41  )-----------( 309      ( 06 Sep 2022 09:40 )             31.7     TSH: Thyroid Stimulating Hormone, Serum: 0.808 uU/mL (08.30.22 @ 12:02)      INR: 1.05 ratio (09-06 @ 09:40)

## 2022-09-08 NOTE — PROGRESS NOTE ADULT - SUBJECTIVE AND OBJECTIVE BOX
POD #2  Patient seen and examined at bedside, patient without complaints.   Abdominal pain is well controlled.  Denies nausea and vomiting. Tolerating clear liquid diet.  Denies chest pain, dyspnea, cough.      MEDICATIONS  (STANDING):  acetaminophen     Tablet .. 975 milliGRAM(s) Oral every 6 hours  dextrose 5% + sodium chloride 0.45% with potassium chloride 20 mEq/L 1000 milliLiter(s) (50 mL/Hr) IV Continuous <Continuous>  enoxaparin Injectable 40 milliGRAM(s) SubCutaneous every 24 hours  ketorolac   Injectable 15 milliGRAM(s) IV Push every 6 hours    MEDICATIONS  (PRN):  famotidine Injectable 20 milliGRAM(s) IV Push every 12 hours PRN Dyspepsia  gabapentin 100 milliGRAM(s) Oral every 8 hours PRN pain mild  ondansetron Injectable 4 milliGRAM(s) IV Push every 6 hours PRN Nausea  traMADol 25 milliGRAM(s) Oral every 4 hours PRN Moderate Pain (4 - 6)      Vital Signs Last 24 Hrs  T(C): 36.5 (08 Sep 2022 11:09), Max: 36.8 (07 Sep 2022 14:49)  T(F): 97.7 (08 Sep 2022 11:09), Max: 98.2 (07 Sep 2022 14:49)  HR: 77 (08 Sep 2022 11:09) (76 - 83)  BP: 131/89 (08 Sep 2022 11:09) (127/62 - 166/87)  RR: 18 (08 Sep 2022 11:09) (17 - 20)  SpO2: 97% (08 Sep 2022 11:09) (96% - 100%)      PHYSICAL EXAM:  CONSTITUTIONAL: Alert, NAD  RESPIRATORY: Clear to auscultation bilaterally, respirations nonlabored  CARDIOVASCULAR: S1S2, Regular rate and rhythm  GASTROINTESTINAL: Dressing and incisions C/D/I, Nondistended, + Bowel sounds, soft, Appropriate incisional tenderness  : Lopez indwelling with clear yellow urine   MUSCULOSKELETAL:  no calf tenderness, No edema, intermittent compression devices in place bilaterally        LABS:                        7.8    7.89  )-----------( 267      ( 08 Sep 2022 07:39 )             25.7     09-08    135  |  103  |  20  ----------------------------<  93  4.6   |  30  |  0.96    Ca    7.8<L>      08 Sep 2022 07:39  Phos  2.5     09-08  Mg     2.3     09-08

## 2022-09-08 NOTE — PROGRESS NOTE ADULT - SUBJECTIVE AND OBJECTIVE BOX
INTERVAL HPI/OVERNIGHT EVENTS:        REVIEW OF SYSTEMS:  CONSTITUTIONAL:  no  complaints    NECK: No pain or stiffnes  RESPIRATORY: No SOB   CARDIOVASCULAR: No chest pain, palpitations, dizziness,   GASTROINTESTINAL: No abdominal pain. No nausea, vomiting, + BRBPR  NEUROLOGICAL: No headaches, no  blurry  vision no  dizziness  SKIN: No itching,   MUSCULOSKELETAL: No pain    MEDICATION:  acetaminophen     Tablet .. 975 milliGRAM(s) Oral every 6 hours  dextrose 5% + sodium chloride 0.45% with potassium chloride 20 mEq/L 1000 milliLiter(s) IV Continuous <Continuous>  enoxaparin Injectable 40 milliGRAM(s) SubCutaneous every 24 hours  famotidine Injectable 20 milliGRAM(s) IV Push every 12 hours PRN  gabapentin 100 milliGRAM(s) Oral every 8 hours PRN  ketorolac   Injectable 15 milliGRAM(s) IV Push every 6 hours  ondansetron Injectable 4 milliGRAM(s) IV Push every 6 hours PRN  traMADol 25 milliGRAM(s) Oral every 4 hours PRN    Vital Signs Last 24 Hrs  T(C): 36.8 (08 Sep 2022 05:29), Max: 36.9 (07 Sep 2022 10:49)  T(F): 98.2 (08 Sep 2022 05:29), Max: 98.4 (07 Sep 2022 10:49)  HR: 76 (08 Sep 2022 05:29) (76 - 83)  BP: 135/77 (08 Sep 2022 05:29) (114/72 - 166/87)  BP(mean): --  RR: 18 (08 Sep 2022 05:29) (17 - 20)  SpO2: 96% (08 Sep 2022 05:29) (96% - 100%)        PHYSICAL EXAM:  GENERAL: NAD, well-groomed, well-developed  HEENT : Conjuntivae  clear sclerae anicteric  NECK: Supple, No JVD, Normal thyroid  NERVOUS SYSTEM:  Alert oriented   no  focal  deficits;   CHEST/LUNG: Clear    HEART: Regular rate and rhythm; No murmurs, rubs, or gallops  ABDOMEN: Soft, Nontender, Nondistended; Bowel sounds present  EXTREMITIES:  no  edema no  tenderness  SKIN: No rashes   LABS:                        7.8    7.89  )-----------( 267      ( 08 Sep 2022 07:39 )             25.7     09-08    135  |  103  |  20  ----------------------------<  93  4.6   |  30  |  0.96    Ca    7.8<L>      08 Sep 2022 07:39  Phos  2.5     09-08  Mg     2.3     09-08          CAPILLARY BLOOD GLUCOSE          RADIOLOGY & ADDITIONAL TESTS:    Imaging reports  Personally Reviewed:  [ ] YES  [ ] NO    Consultant(s) Notes Reviewed:  [ x] YES  [ ] NO    Care Discussed with Consultants/Other Providers [x ] YES  [ ] NO  Problem/Plan - 1:  ·  Problem: Anemia. colon  mass s/p  rt hemicolectomy  ·  Plan:   advance  diet  as per  surgery  check  pathology  report  monitor  h/h   oncology  evaluation appreciated    Problem/Plan - 2:  ·  Problem: Anxiety.   ·  Plan: xanx prn.    Problem/Plan - 3:  ·  Problem: History of bladder cancer.   ·  Plan: u/a  observation.    knee  pain  OA  tylenol

## 2022-09-09 LAB
ALBUMIN SERPL ELPH-MCNC: 2.1 G/DL — LOW (ref 3.3–5)
ALP SERPL-CCNC: 79 U/L — SIGNIFICANT CHANGE UP (ref 40–120)
ALT FLD-CCNC: 13 U/L — SIGNIFICANT CHANGE UP (ref 12–78)
ANION GAP SERPL CALC-SCNC: 6 MMOL/L — SIGNIFICANT CHANGE UP (ref 5–17)
AST SERPL-CCNC: 23 U/L — SIGNIFICANT CHANGE UP (ref 15–37)
BASOPHILS # BLD AUTO: 0.06 K/UL — SIGNIFICANT CHANGE UP (ref 0–0.2)
BASOPHILS NFR BLD AUTO: 0.9 % — SIGNIFICANT CHANGE UP (ref 0–2)
BILIRUB SERPL-MCNC: 0.2 MG/DL — SIGNIFICANT CHANGE UP (ref 0.2–1.2)
BUN SERPL-MCNC: 22 MG/DL — SIGNIFICANT CHANGE UP (ref 7–23)
CALCIUM SERPL-MCNC: 8.4 MG/DL — LOW (ref 8.5–10.1)
CHLORIDE SERPL-SCNC: 104 MMOL/L — SIGNIFICANT CHANGE UP (ref 96–108)
CO2 SERPL-SCNC: 27 MMOL/L — SIGNIFICANT CHANGE UP (ref 22–31)
CREAT SERPL-MCNC: 0.87 MG/DL — SIGNIFICANT CHANGE UP (ref 0.5–1.3)
EGFR: 66 ML/MIN/1.73M2 — SIGNIFICANT CHANGE UP
EOSINOPHIL # BLD AUTO: 0.56 K/UL — HIGH (ref 0–0.5)
EOSINOPHIL NFR BLD AUTO: 8.3 % — HIGH (ref 0–6)
GLUCOSE SERPL-MCNC: 115 MG/DL — HIGH (ref 70–99)
HCT VFR BLD CALC: 27.2 % — LOW (ref 34.5–45)
HGB BLD-MCNC: 8.2 G/DL — LOW (ref 11.5–15.5)
IMM GRANULOCYTES NFR BLD AUTO: 0.3 % — SIGNIFICANT CHANGE UP (ref 0–1.5)
LYMPHOCYTES # BLD AUTO: 1.09 K/UL — SIGNIFICANT CHANGE UP (ref 1–3.3)
LYMPHOCYTES # BLD AUTO: 16.2 % — SIGNIFICANT CHANGE UP (ref 13–44)
MAGNESIUM SERPL-MCNC: 2.4 MG/DL — SIGNIFICANT CHANGE UP (ref 1.6–2.6)
MCHC RBC-ENTMCNC: 23.2 PG — LOW (ref 27–34)
MCHC RBC-ENTMCNC: 30.1 G/DL — LOW (ref 32–36)
MCV RBC AUTO: 77.1 FL — LOW (ref 80–100)
MONOCYTES # BLD AUTO: 0.37 K/UL — SIGNIFICANT CHANGE UP (ref 0–0.9)
MONOCYTES NFR BLD AUTO: 5.5 % — SIGNIFICANT CHANGE UP (ref 2–14)
NEUTROPHILS # BLD AUTO: 4.64 K/UL — SIGNIFICANT CHANGE UP (ref 1.8–7.4)
NEUTROPHILS NFR BLD AUTO: 68.8 % — SIGNIFICANT CHANGE UP (ref 43–77)
NRBC # BLD: 0 /100 WBCS — SIGNIFICANT CHANGE UP (ref 0–0)
PHOSPHATE SERPL-MCNC: 2.7 MG/DL — SIGNIFICANT CHANGE UP (ref 2.5–4.5)
PLATELET # BLD AUTO: 316 K/UL — SIGNIFICANT CHANGE UP (ref 150–400)
POTASSIUM SERPL-MCNC: 4 MMOL/L — SIGNIFICANT CHANGE UP (ref 3.5–5.3)
POTASSIUM SERPL-SCNC: 4 MMOL/L — SIGNIFICANT CHANGE UP (ref 3.5–5.3)
PROT SERPL-MCNC: 6 GM/DL — SIGNIFICANT CHANGE UP (ref 6–8.3)
RBC # BLD: 3.53 M/UL — LOW (ref 3.8–5.2)
RBC # FLD: 25.8 % — HIGH (ref 10.3–14.5)
SODIUM SERPL-SCNC: 137 MMOL/L — SIGNIFICANT CHANGE UP (ref 135–145)
WBC # BLD: 6.74 K/UL — SIGNIFICANT CHANGE UP (ref 3.8–10.5)
WBC # FLD AUTO: 6.74 K/UL — SIGNIFICANT CHANGE UP (ref 3.8–10.5)

## 2022-09-09 RX ORDER — OXYCODONE HYDROCHLORIDE 5 MG/1
1 TABLET ORAL
Qty: 30 | Refills: 0
Start: 2022-09-09

## 2022-09-09 RX ADMIN — TRAMADOL HYDROCHLORIDE 25 MILLIGRAM(S): 50 TABLET ORAL at 17:01

## 2022-09-09 RX ADMIN — Medication 975 MILLIGRAM(S): at 23:06

## 2022-09-09 RX ADMIN — Medication 975 MILLIGRAM(S): at 05:37

## 2022-09-09 RX ADMIN — Medication 975 MILLIGRAM(S): at 06:37

## 2022-09-09 RX ADMIN — TRAMADOL HYDROCHLORIDE 25 MILLIGRAM(S): 50 TABLET ORAL at 16:01

## 2022-09-09 NOTE — PROGRESS NOTE ADULT - ASSESSMENT
86 YOF with a PMHx of bladder ca; admitted with anemia with Hg 5 -> 27-30 s/p multiple transfusions.  Found to have colonic mass; went to OR for R hemicolectomy POD1  Echo 9/5/22:  LVEF 60% with moderate to severe AI  EKG: sinus 84bpm  She states that shes known about the "heart valve leak" for many years; nothing done bc she was asymptomatic.  She continues to deny chest pain/palpitations/syncope; +dyspnea only during this admission w/ severe anemia -> now resolved with stable HCT.    Plan:  - Intermediate cardiovascular risk for an intermediate risk procedure, S/p R hemicolectomy POD2 doing well, eager to go home  - Not on O2, doing well on room air. prn lasix if O2sats <94% or rales on exam.  - Will likely require eval for AVR once surgical-GI/heme-onc issues resolved.  - Follow up outpatient Dr Levin for mod-severe AVR  - Cardio to sign off
86 year old female with colon mass and hx of bladder ca,     possible right hemicolectomy this coming week.   medical optimization prior to OR.   discussed with Dr. Somers    
87 yo female with hx of Hyperlipidemia admitted with severe anemia HB 5.0 as outpatient and here in ED 6.0,  SOB and LOZANO  otherwise ROS is negative     CT scan r colonic mass ? neoplastic process elevated CEA 10.4      Plan for colonoscopy for further work up   Surgery consultation   Oncology pending pathology
85 yo female with hx of Hyperlipidemia admitted with severe anemia HB 5.0 as outpatient and here in ED 6.0,  SOB and LOZANO  otherwise ROS is negative     CT scan r colonic mass ? neoplastic process   Elevated CEA     Plan for colonoscopy for further work up   Surgery consultation   Oncology pending pathology    Problem/Recommendation - 1:  ·  Problem: Anemia.   ·  Recommendation: - transfuse for HB < 7.5.    Problem/Recommendation - 2:  ·  Problem: Mass of colon.   ·  Recommendation: - full liquid diet  - consider colonoscopy will discuss with patient and family  - check CEA.  
86 year old female with colon mass    npo   s/p zayra's prep  labs reviewed  appreciate cardiology input  2 unit prbc on call Neal  consent on chart. 
86 year old female with colon mass and hx of bladder ca,     For OR tomorrow for right hemicolectomy  Clear liquids today NPO after midnight.   Covid screen, Type and screen, transfuse 1 unit of PRBC today, 2 units on hold for OR. ECHO today if possible   Start Neomycin, flagyl bowel prep 2p,3p,10p today  discussed with Dr. Oakley and medical attending
Assessment: 86F S/P laparoscopic right hemicolectomy POD#2.     Plan:  - Continue local wound care  - Follow up labs, trend H/H  - DVT prophylaxis, Incentive Spirometer, OOB, Ambulating, pain control, PT  - Continue current management per medicine/cardiology  Discussed with Dr. Kitchen    
86 YOF with a PMHx of bladder ca; admitted with anemia with Hg 5 -> 27-30 s/p multiple transfusions.  Found to have colonic mass; went to OR for R hemicolectomy POD1  Echo 9/5/22:  LVEF 60% with moderate to severe AI  EKG: sinus 84bpm  She states that shes known about the "heart valve leak" for many years; nothing done bc she was asymptomatic.  She continues to deny chest pain/palpitations/syncope; +dyspnea only during this admission w/ severe anemia -> now resolved with stable HCT.    Plan:  - Intermediate cardiovascular risk for an intermediate risk procedure, S/p R hemicolectomy POD1 doing well, eager to go home  - Not on O2, doing well on room air. prn lasix if O2sats <94% or rales on exam.  - Will likely require eval for AVR once surgical-GI/heme-onc issues resolved.  - Follow up outpatient Dr Levin for mod-severe AVR
86 Year old female POD #3 s/p right hemicolectomy for colon mass.     anemia noted however appears to have stabilized and patient denies symptoms  tolerating regular diet  normal BM today  patient can discharge home from surgical perspective and follow up with Dr. Lu in 2 weeks for staple removal  follow up pathology and oncology as outpatient.   seen with Dr. Somers on rounds. 
87 yo female with hx of Hyperlipidemia admitted with severe anemia HB 5.0 as outpatient and here in ED 6.0,  SOB and LOZANO  otherwise ROS is negative     CT scan r colonic mass ? neoplastic process elevated CEA 10.4    colonoscopy 9/1 - nearly obstructing annular mass extending to hepatic flexure un able to pass distally (biopsied and tattoo injection of the distal margin of mass)  PATH PENDING

## 2022-09-09 NOTE — PROGRESS NOTE ADULT - SUBJECTIVE AND OBJECTIVE BOX
HPI:86 yr old female POD#3 s/p lap right hemicolectomy. Doing well, ambulating, tolerating diet, having BM    Vital Signs Last 24 Hrs  T(C): 36.8 (09 Sep 2022 12:30), Max: 37 (08 Sep 2022 17:28)  T(F): 98.2 (09 Sep 2022 12:30), Max: 98.6 (08 Sep 2022 17:28)  HR: 89 (09 Sep 2022 12:30) (80 - 99)  BP: 167/83 (09 Sep 2022 12:30) (108/66 - 173/84)  BP(mean): --  RR: 17 (09 Sep 2022 12:30) (16 - 18)  SpO2: 99% (09 Sep 2022 12:30) (95% - 100%)    Parameters below as of 09 Sep 2022 12:00  Patient On (Oxygen Delivery Method): room air        MEDICATIONS  (STANDING):  acetaminophen     Tablet .. 975 milliGRAM(s) Oral every 6 hours  dextrose 5% + sodium chloride 0.45% with potassium chloride 20 mEq/L 1000 milliLiter(s) (50 mL/Hr) IV Continuous <Continuous>    MEDICATIONS  (PRN):  famotidine Injectable 20 milliGRAM(s) IV Push every 12 hours PRN Dyspepsia  gabapentin 100 milliGRAM(s) Oral every 8 hours PRN pain mild  ondansetron Injectable 4 milliGRAM(s) IV Push every 6 hours PRN Nausea  traMADol 25 milliGRAM(s) Oral every 4 hours PRN Moderate Pain (4 - 6)      PHYSICAL EXAM:      Constitutional: awake and alert.  HEENT: PERRLA, EOMI,   Neck: Supple.  Respiratory: Breath sounds are clear bilaterally  Cardiovascular: S1 and S2, regular  Gastrointestinal: soft, minimally tender (appropriately tender) at incisions. wound c/d/i staples in place. +BSx4  Extremities:  no edema  Musculoskeletal: no joint swelling/tenderness, no abnormal movements  Skin: No rashes        LABS:                         8.2    6.74  )-----------( 316      ( 09 Sep 2022 08:03 )             27.2     09-09    137  |  104  |  22  ----------------------------<  115<H>  4.0   |  27  |  0.87    Ca    8.4<L>      09 Sep 2022 08:03  Phos  2.7     09-09  Mg     2.4     09-09    TPro  6.0  /  Alb  2.1<L>  /  TBili  0.2  /  DBili  x   /  AST  23  /  ALT  13  /  AlkPhos  79  09-09    LIVER FUNCTIONS - ( 09 Sep 2022 08:03 )  Alb: 2.1 g/dL / Pro: 6.0 gm/dL / ALK PHOS: 79 U/L / ALT: 13 U/L / AST: 23 U/L / GGT: x

## 2022-09-09 NOTE — PROGRESS NOTE ADULT - SUBJECTIVE AND OBJECTIVE BOX
INTERVAL HPI/OVERNIGHT EVENTS:        REVIEW OF SYSTEMS:  CONSTITUTIONAL: feels wel no  complaints    NECK: No pain or stiffnes  RESPIRATORY: No SOB   CARDIOVASCULAR: No chest pain, palpitations, dizziness,   GASTROINTESTINAL: No abdominal pain. No nausea, vomiting,   NEUROLOGICAL: No headaches, no  blurry  vision no  dizziness  SKIN: No itching,   MUSCULOSKELETAL: No pain    MEDICATION:  acetaminophen     Tablet .. 975 milliGRAM(s) Oral every 6 hours  dextrose 5% + sodium chloride 0.45% with potassium chloride 20 mEq/L 1000 milliLiter(s) IV Continuous <Continuous>  famotidine Injectable 20 milliGRAM(s) IV Push every 12 hours PRN  gabapentin 100 milliGRAM(s) Oral every 8 hours PRN  ondansetron Injectable 4 milliGRAM(s) IV Push every 6 hours PRN  traMADol 25 milliGRAM(s) Oral every 4 hours PRN    Vital Signs Last 24 Hrs  T(C): 36.7 (09 Sep 2022 05:10), Max: 37 (08 Sep 2022 17:28)  T(F): 98.1 (09 Sep 2022 05:10), Max: 98.6 (08 Sep 2022 17:28)  HR: 83 (09 Sep 2022 05:10) (77 - 85)  BP: 135/71 (09 Sep 2022 05:10) (108/66 - 173/84)  BP(mean): --  RR: 16 (09 Sep 2022 05:10) (16 - 18)  SpO2: 96% (09 Sep 2022 05:10) (95% - 97%)        PHYSICAL EXAM:  GENERAL: NAD, well-groomed, well-developed  HEENT : Conjuntivae  clear sclerae anicteric  NECK: Supple, No JVD, Normal thyroid  NERVOUS SYSTEM:  Alert oriented   no  focal  deficits;   CHEST/LUNG: Clear    HEART: Regular rate and rhythm; No murmurs, rubs, or gallops  ABDOMEN: Soft, Nontender, Nondistended; Bowel sounds present  EXTREMITIES:  no  edema no  tenderness  SKIN: No rashes   LABS:                        9.1    8.78  )-----------( 355      ( 08 Sep 2022 18:34 )             30.2     09-08    135  |  103  |  20  ----------------------------<  93  4.6   |  30  |  0.96    Ca    7.8<L>      08 Sep 2022 07:39  Phos  2.5     09-08  Mg     2.3     09-08          CAPILLARY BLOOD GLUCOSE          RADIOLOGY & ADDITIONAL TESTS:    Imaging reports  Personally Reviewed:  [ ] YES  [ ] NO    Consultant(s) Notes Reviewed:  [x ] YES  [ ] NO    Care Discussed with Consultants/Other Providers [x ] YES  [ ] NO  Problem/Plan - 1:  ·  Problem: Anemia. colon  mass s/p  rt hemicolectomy  ·  Plan:   diet  as per  surgery  check  pathology  report further  Rx  as  per  oncology  monitor  h/h       Problem/Plan - 2:  ·  Problem: Anxiety.   ·  Plan: xanx prn.    Problem/Plan - 3:  ·  Problem: History of bladder cancer.   ·  Plan: u/a  observation.    knee  pain  OA  tylenol   discharge  in  AM  if  continued  stable

## 2022-09-10 ENCOUNTER — TRANSCRIPTION ENCOUNTER (OUTPATIENT)
Age: 87
End: 2022-09-10

## 2022-09-10 VITALS
DIASTOLIC BLOOD PRESSURE: 79 MMHG | OXYGEN SATURATION: 98 % | RESPIRATION RATE: 17 BRPM | HEART RATE: 89 BPM | SYSTOLIC BLOOD PRESSURE: 153 MMHG | TEMPERATURE: 98 F

## 2022-09-10 DIAGNOSIS — M19.90 UNSPECIFIED OSTEOARTHRITIS, UNSPECIFIED SITE: ICD-10-CM

## 2022-09-10 LAB
ALBUMIN SERPL ELPH-MCNC: 1.8 G/DL — LOW (ref 3.3–5)
ALP SERPL-CCNC: 94 U/L — SIGNIFICANT CHANGE UP (ref 40–120)
ALT FLD-CCNC: 17 U/L — SIGNIFICANT CHANGE UP (ref 12–78)
ANION GAP SERPL CALC-SCNC: 4 MMOL/L — LOW (ref 5–17)
AST SERPL-CCNC: 31 U/L — SIGNIFICANT CHANGE UP (ref 15–37)
BILIRUB SERPL-MCNC: 0.2 MG/DL — SIGNIFICANT CHANGE UP (ref 0.2–1.2)
BUN SERPL-MCNC: 23 MG/DL — SIGNIFICANT CHANGE UP (ref 7–23)
CALCIUM SERPL-MCNC: 8.3 MG/DL — LOW (ref 8.5–10.1)
CHLORIDE SERPL-SCNC: 107 MMOL/L — SIGNIFICANT CHANGE UP (ref 96–108)
CO2 SERPL-SCNC: 27 MMOL/L — SIGNIFICANT CHANGE UP (ref 22–31)
CREAT SERPL-MCNC: 0.66 MG/DL — SIGNIFICANT CHANGE UP (ref 0.5–1.3)
EGFR: 86 ML/MIN/1.73M2 — SIGNIFICANT CHANGE UP
GLUCOSE SERPL-MCNC: 105 MG/DL — HIGH (ref 70–99)
HCT VFR BLD CALC: 26.4 % — LOW (ref 34.5–45)
HGB BLD-MCNC: 8 G/DL — LOW (ref 11.5–15.5)
MAGNESIUM SERPL-MCNC: 2.3 MG/DL — SIGNIFICANT CHANGE UP (ref 1.6–2.6)
MCHC RBC-ENTMCNC: 23.3 PG — LOW (ref 27–34)
MCHC RBC-ENTMCNC: 30.3 G/DL — LOW (ref 32–36)
MCV RBC AUTO: 76.7 FL — LOW (ref 80–100)
NRBC # BLD: 0 /100 WBCS — SIGNIFICANT CHANGE UP (ref 0–0)
PHOSPHATE SERPL-MCNC: 2.6 MG/DL — SIGNIFICANT CHANGE UP (ref 2.5–4.5)
PLATELET # BLD AUTO: 357 K/UL — SIGNIFICANT CHANGE UP (ref 150–400)
POTASSIUM SERPL-MCNC: 3.7 MMOL/L — SIGNIFICANT CHANGE UP (ref 3.5–5.3)
POTASSIUM SERPL-SCNC: 3.7 MMOL/L — SIGNIFICANT CHANGE UP (ref 3.5–5.3)
PROT SERPL-MCNC: 5.8 GM/DL — LOW (ref 6–8.3)
RBC # BLD: 3.44 M/UL — LOW (ref 3.8–5.2)
RBC # FLD: 26.2 % — HIGH (ref 10.3–14.5)
SODIUM SERPL-SCNC: 138 MMOL/L — SIGNIFICANT CHANGE UP (ref 135–145)
WBC # BLD: 5.67 K/UL — SIGNIFICANT CHANGE UP (ref 3.8–10.5)
WBC # FLD AUTO: 5.67 K/UL — SIGNIFICANT CHANGE UP (ref 3.8–10.5)

## 2022-09-10 RX ORDER — FAMOTIDINE 10 MG/ML
2 INJECTION INTRAVENOUS
Qty: 0 | Refills: 0 | DISCHARGE
Start: 2022-09-10

## 2022-09-10 RX ORDER — GABAPENTIN 400 MG/1
1 CAPSULE ORAL
Qty: 0 | Refills: 0 | DISCHARGE
Start: 2022-09-10

## 2022-09-10 RX ORDER — TRAMADOL HYDROCHLORIDE 50 MG/1
0.5 TABLET ORAL
Qty: 23 | Refills: 0
Start: 2022-09-10 | End: 2022-09-24

## 2022-09-10 RX ORDER — GABAPENTIN 400 MG/1
1 CAPSULE ORAL
Qty: 90 | Refills: 0
Start: 2022-09-10 | End: 2022-10-09

## 2022-09-10 RX ORDER — TRAMADOL HYDROCHLORIDE 50 MG/1
0.5 TABLET ORAL
Qty: 0 | Refills: 0 | DISCHARGE
Start: 2022-09-10

## 2022-09-10 RX ORDER — ACETAMINOPHEN 500 MG
3 TABLET ORAL
Qty: 0 | Refills: 0 | DISCHARGE
Start: 2022-09-10

## 2022-09-10 RX ADMIN — TRAMADOL HYDROCHLORIDE 25 MILLIGRAM(S): 50 TABLET ORAL at 07:58

## 2022-09-10 RX ADMIN — Medication 975 MILLIGRAM(S): at 00:06

## 2022-09-10 RX ADMIN — Medication 975 MILLIGRAM(S): at 07:58

## 2022-09-10 NOTE — DISCHARGE NOTE NURSING/CASE MANAGEMENT/SOCIAL WORK - NSDCPEFALRISK_GEN_ALL_CORE
For information on Fall & Injury Prevention, visit: [email or web address] OR  [email or web address] OR  https://www.cdc.gov/steadi/patient.html

## 2022-09-10 NOTE — PROGRESS NOTE ADULT - PROBLEM SELECTOR PLAN 1
CBC
- transfuse PRN for HB <7.5
- transfuse for HB < 7.5.
-Trend hg   -Transfuse < 7   -Monitor for GI bleeding

## 2022-09-10 NOTE — PROGRESS NOTE ADULT - REASON FOR ADMISSION
anemia  gamez

## 2022-09-10 NOTE — PROGRESS NOTE ADULT - PROBLEM SELECTOR PLAN 2
- await pathology   - discussed findings with surgery  - oncology consultation   - Full liquid diet with ensure supplements
-Plan for colonoscopy tomorrow   -Golytely prep   -CLD  *Risks/benefits discussed with patient in detail including but not limited to infection, bleeding, perforation, death; she opts to proceed
- full liquid diet  - colonoscopy today  Risks, benefits and alternatives discussed at length with patient  and informed consent obtained. All questions were answered.   - NPO
Colon Cancer:-  Adenocarcinoma  Right Hemicolectomy  Post Op Care.  Check Pathology  Office Follow up.

## 2022-09-10 NOTE — DISCHARGE NOTE PROVIDER - CARE PROVIDER_API CALL
Tori Lu)  Surgery  [address] [place] [address]  Phone: [phone]  Fax: [phone]  Follow Up Time:     Darya Bhatia  MEDICAL ONCOLOGY  [address] [place] [address]  Phone: [phone]  Fax: [phone]  Follow Up Time:     Dk Levin)  Cardiology; Cardiovascular Disease; Nuclear Cardiology  [address] [facility], [address]  [place] [address]  Phone: [phone]  Fax: [phone]  Follow Up Time:     Jax Dupont)  Physician  NPs  [address] [place] [address]  Phone: [phone]  Fax: [phone]  Follow Up Time:

## 2022-09-10 NOTE — DISCHARGE NOTE PROVIDER - CARE PROVIDERS DIRECT ADDRESSES
,DirectAddress_Unknown,DirectAddress_Unknown,[email or web address]

## 2022-09-10 NOTE — DISCHARGE NOTE PROVIDER - DETAILS OF MALNUTRITION DIAGNOSIS/DIAGNOSES
This patient has been assessed with a concern for Malnutrition and was treated during this hospitalization for the following Nutrition diagnosis/diagnoses:     -  09/07/2022: Moderate protein-calorie malnutrition

## 2022-09-10 NOTE — PROGRESS NOTE ADULT - PROVIDER SPECIALTY LIST ADULT
Internal Medicine
Internal Medicine
Surgery
Internal Medicine
Internal Medicine
Cardiology
Internal Medicine
Surgery
Cardiology
Gastroenterology
Gastroenterology
Heme/Onc
Gastroenterology

## 2022-09-10 NOTE — DISCHARGE NOTE PROVIDER - NSDCMRMEDTOKEN_GEN_ALL_CORE_FT
acetaminophen 325 mg oral tablet: 3 tab(s) orally every 6 hours  famotidine 10 mg/mL intravenous solution: 2 milliliter(s) intravenous every 12 hours, As needed, Dyspepsia  gabapentin 100 mg oral capsule: 1 cap(s) orally every 8 hours, As needed, pain mild  traMADol 50 mg oral tablet: 0.5 tab(s) orally 1 to 3 times a day, As Needed - 6)

## 2022-09-10 NOTE — DISCHARGE NOTE PROVIDER - PROVIDER TOKENS
PROVIDER:[TOKEN:[[id number]]],PROVIDER:[TOKEN:[[id number]]],PROVIDER:[TOKEN:[[id number]]],PROVIDER:[TOKEN:[[id number]]]

## 2022-09-10 NOTE — DISCHARGE NOTE PROVIDER - NSDCHC_MEDRECSTATUS_GEN_ALL_CORE
This was an emergent procedure and consent was implied. Admission Reconciliation is Completed  Discharge Reconciliation is Completed

## 2022-09-10 NOTE — PROGRESS NOTE ADULT - SUBJECTIVE AND OBJECTIVE BOX
INTERVAL History:  Colon Cancer    Allergies    No Known Allergies    Intolerances        MEDICATIONS  (STANDING):  acetaminophen     Tablet .. 975 milliGRAM(s) Oral every 6 hours  dextrose 5% + sodium chloride 0.45% with potassium chloride 20 mEq/L 1000 milliLiter(s) (50 mL/Hr) IV Continuous <Continuous>    MEDICATIONS  (PRN):  famotidine Injectable 20 milliGRAM(s) IV Push every 12 hours PRN Dyspepsia  gabapentin 100 milliGRAM(s) Oral every 8 hours PRN pain mild  ondansetron Injectable 4 milliGRAM(s) IV Push every 6 hours PRN Nausea  traMADol 25 milliGRAM(s) Oral every 4 hours PRN Moderate Pain (4 - 6)      Vital Signs Last 24 Hrs  T(C): 36.6 (10 Sep 2022 05:09), Max: 37.5 (09 Sep 2022 17:21)  T(F): 97.9 (10 Sep 2022 05:09), Max: 99.5 (09 Sep 2022 17:21)  HR: 79 (10 Sep 2022 05:09) (79 - 99)  BP: 131/68 (10 Sep 2022 05:09) (131/68 - 167/83)  BP(mean): --  RR: 18 (10 Sep 2022 05:09) (16 - 18)  SpO2: 96% (10 Sep 2022 05:09) (96% - 100%)    Parameters below as of 10 Sep 2022 05:09  Patient On (Oxygen Delivery Method): room air        PHYSICAL EXAM:    VSS  EYES: EOMI, PERRLA, conjunctiva and sclera clear  NECK: Supple, No JVD, Normal thyroid  CHEST/LUNG: Clear to percussion bilaterally; No rales, rhonchi,   HEART: Regular rate and rhythm;   ABDOMEN: Soft, Nontender.  LYMPH: No lymphadenopathy noted  Scar of Surgery      LABS:                        8.0    5.67  )-----------( 357      ( 10 Sep 2022 06:05 )             26.4     09-10    138  |  107  |  23  ----------------------------<  105<H>  3.7   |  27  |  0.66    Ca    8.3<L>      10 Sep 2022 06:05  Phos  2.6     09-10  Mg     2.3     09-10    TPro  5.8<L>  /  Alb  1.8<L>  /  TBili  0.2  /  DBili  x   /  AST  31  /  ALT  17  /  AlkPhos  94  09-10            RADIOLOGY & ADDITIONAL STUDIES:    PATHOLOGY:

## 2022-09-10 NOTE — PROGRESS NOTE ADULT - NUTRITIONAL ASSESSMENT
This patient has been assessed with a concern for Malnutrition and has been determined to have a diagnosis/diagnoses of Moderate protein-calorie malnutrition.    This patient is being managed with:   Diet Consistent Carbohydrate Clear Liquid-  Supplement Feeding Modality:  Oral  Ensure Clear Cans or Servings Per Day:  1       Frequency:  Three Times a day  Entered: Sep  7 2022  7:34PM    
This patient has been assessed with a concern for Malnutrition and has been determined to have a diagnosis/diagnoses of Moderate protein-calorie malnutrition.    This patient is being managed with:   Diet Regular-  Fiber/Residue Restricted  Supplement Feeding Modality:  Oral  Ensure Enlive Cans or Servings Per Day:  1       Frequency:  Three Times a day  Entered: Sep  8 2022  1:23PM    
This patient has been assessed with a concern for Malnutrition and has been determined to have a diagnosis/diagnoses of Moderate protein-calorie malnutrition.    This patient is being managed with:   Diet Regular-  Fiber/Residue Restricted  Supplement Feeding Modality:  Oral  Ensure Enlive Cans or Servings Per Day:  1       Frequency:  Three Times a day  Entered: Sep  8 2022  1:23PM    
This patient has been assessed with a concern for Malnutrition and has been determined to have a diagnosis/diagnoses of Moderate protein-calorie malnutrition.    This patient is being managed with:   Diet Consistent Carbohydrate Clear Liquid-  Entered: Sep  6 2022  6:40PM    
This patient has been assessed with a concern for Malnutrition and has been determined to have a diagnosis/diagnoses of Moderate protein-calorie malnutrition.    This patient is being managed with:   Diet Consistent Carbohydrate Clear Liquid-  Entered: Sep  6 2022  6:40PM    
This patient has been assessed with a concern for Malnutrition and has been determined to have a diagnosis/diagnoses of Moderate protein-calorie malnutrition.    This patient is being managed with:   Diet Regular-  Fiber/Residue Restricted  Supplement Feeding Modality:  Oral  Ensure Enlive Cans or Servings Per Day:  1       Frequency:  Three Times a day  Entered: Sep  8 2022  1:23PM

## 2022-09-10 NOTE — PROGRESS NOTE ADULT - SUBJECTIVE AND OBJECTIVE BOX
INTERVAL HPI/OVERNIGHT EVENTS:        REVIEW OF SYSTEMS:  CONSTITUTIONAL:  abd  pain  decreased  appetite    NECK: No pain or stiffnes  RESPIRATORY: No SOB   CARDIOVASCULAR: No chest pain, palpitations, dizziness,   GASTROINTESTINAL:  No nausea, vomiting,   NEUROLOGICAL: No headaches, no  blurry  vision no  dizziness  SKIN: No itching,   MUSCULOSKELETAL: No pain    MEDICATION:  acetaminophen     Tablet .. 975 milliGRAM(s) Oral every 6 hours  dextrose 5% + sodium chloride 0.45% with potassium chloride 20 mEq/L 1000 milliLiter(s) IV Continuous <Continuous>  famotidine Injectable 20 milliGRAM(s) IV Push every 12 hours PRN  gabapentin 100 milliGRAM(s) Oral every 8 hours PRN  ondansetron Injectable 4 milliGRAM(s) IV Push every 6 hours PRN  traMADol 25 milliGRAM(s) Oral every 4 hours PRN    Vital Signs Last 24 Hrs  T(C): 36.6 (10 Sep 2022 05:09), Max: 37.5 (09 Sep 2022 17:21)  T(F): 97.9 (10 Sep 2022 05:09), Max: 99.5 (09 Sep 2022 17:21)  HR: 79 (10 Sep 2022 05:09) (79 - 99)  BP: 131/68 (10 Sep 2022 05:09) (131/68 - 167/83)  BP(mean): --  RR: 18 (10 Sep 2022 05:09) (16 - 18)  SpO2: 96% (10 Sep 2022 05:09) (96% - 100%)    Parameters below as of 10 Sep 2022 05:09  Patient On (Oxygen Delivery Method): room air        PHYSICAL EXAM:  GENERAL: NAD, well-groomed, well-developed  HEENT : Conjuntivae  clear sclerae anicteric  NECK: Supple, No JVD, Normal thyroid  NERVOUS SYSTEM:  Alert oriented   no  focal  deficits;   CHEST/LUNG: Clear    HEART: Regular rate and rhythm; No murmurs, rubs, or gallops  ABDOMEN: Soft, Nontender, Nondistended; Bowel sounds present  EXTREMITIES:  no  edema no  tenderness  SKIN: No rashes   LABS:                        8.0    5.67  )-----------( 357      ( 10 Sep 2022 06:05 )             26.4     09-10    138  |  107  |  23  ----------------------------<  105<H>  3.7   |  27  |  0.66    Ca    8.3<L>      10 Sep 2022 06:05  Phos  2.6     09-10  Mg     2.3     09-10    TPro  5.8<L>  /  Alb  1.8<L>  /  TBili  0.2  /  DBili  x   /  AST  31  /  ALT  17  /  AlkPhos  94  09-10        CAPILLARY BLOOD GLUCOSE          RADIOLOGY & ADDITIONAL TESTS:    Imaging reports  Personally Reviewed:  [ ] YES  [ ] NO    Consultant(s) Notes Reviewed:  [x ] YES  [ ] NO    Care Discussed with Consultants/Other Providers [ x] YES  [ ] NO  Problem/Plan - 1:  ·  Problem: Anemia. colon  mass s/p  rt hemicolectomy  ·  Plan:   diet  as per  surgery  check  pathology  report further  Rx  as  per  oncology  monitor  h/h       Problem/Plan - 2:  ·  Problem: Anxiety.   ·  Plan: xanx prn.    Problem/Plan - 3:  ·  Problem: History of bladder cancer.   ·  Plan: u/a  observation.    knee  pain  OA  tylenol   discharge    home

## 2022-09-10 NOTE — DISCHARGE NOTE PROVIDER - HOSPITAL COURSE
patient transfusion  3 UPRBCs underwent  underwent  colonoscopy with  biopsy  rt  colonic  mass  +  for  invasive  Adenocarcinoma   underwent rt  hemicolectomy  pathology  report  on  surgical  specimen  pending at  time  of  discharge.  patient  seen  by  cardiology  on  presurgical  evaluation  for moderate  to  severe  AR  for  which  further  w/u  and  rx  indicated  after   Rx  of  colon  CA  patient c/o  abd  pain  with  movements  or  cough  relief  with ultram  ambulating freely no  chest pain  no  sob no  nausea  no  vomiting  alexa moore   requests  home  discharge   home

## 2022-09-10 NOTE — DISCHARGE NOTE NURSING/CASE MANAGEMENT/SOCIAL WORK - PATIENT PORTAL LINK FT
You can access the FollowMyHealth Patient Portal offered by [facility] by registering at the following website: [email or web address]. By joining [employer]’s FollowMyHealth portal, you will also be able to view your health information using other applications (apps) compatible with our system.

## 2022-09-10 NOTE — DISCHARGE NOTE PROVIDER - NSDCCPCAREPLAN_GEN_ALL_CORE_FT
PRINCIPAL DISCHARGE DIAGNOSIS  Diagnosis: Adenocarcinoma, colon  Assessment and Plan of Treatment:       SECONDARY DISCHARGE DIAGNOSES  Diagnosis: Anemia  Assessment and Plan of Treatment:     Diagnosis: Moderate aortic regurgitation  Assessment and Plan of Treatment:     Diagnosis: OA (osteoarthritis)  Assessment and Plan of Treatment:

## 2022-09-12 LAB — SURGICAL PATHOLOGY STUDY: SIGNIFICANT CHANGE UP

## 2022-09-14 ENCOUNTER — APPOINTMENT (OUTPATIENT)
Dept: BARIATRICS | Facility: CLINIC | Age: 87
End: 2022-09-14

## 2022-09-14 VITALS
DIASTOLIC BLOOD PRESSURE: 97 MMHG | SYSTOLIC BLOOD PRESSURE: 186 MMHG | BODY MASS INDEX: 24.11 KG/M2 | WEIGHT: 131 LBS | TEMPERATURE: 98.1 F | HEART RATE: 83 BPM | HEIGHT: 62 IN | OXYGEN SATURATION: 95 %

## 2022-09-14 PROCEDURE — 99024 POSTOP FOLLOW-UP VISIT: CPT

## 2022-09-14 NOTE — PLAN
[FreeTextEntry1] : No restrictions in physical activity after another week\par F/U with PMD and Oncologist\par F/U next week for staple removal

## 2022-09-14 NOTE — HISTORY OF PRESENT ILLNESS
[de-identified] : Patient is an 85 y/o woman with a h/o anemia secondary to colon adenocarcinoma\par She underwent laparsocopic right colecotmy on 9/6/22\par Tolerating PO\par Passing normal BM\par Some pain at LLQ extraction site

## 2022-09-23 DIAGNOSIS — C18.8 MALIGNANT NEOPLASM OF OVERLAPPING SITES OF COLON: ICD-10-CM

## 2022-09-23 DIAGNOSIS — E44.0 MODERATE PROTEIN-CALORIE MALNUTRITION: ICD-10-CM

## 2022-09-23 DIAGNOSIS — Z92.3 PERSONAL HISTORY OF IRRADIATION: ICD-10-CM

## 2022-09-23 DIAGNOSIS — C78.6 SECONDARY MALIGNANT NEOPLASM OF RETROPERITONEUM AND PERITONEUM: ICD-10-CM

## 2022-09-23 DIAGNOSIS — Z85.51 PERSONAL HISTORY OF MALIGNANT NEOPLASM OF BLADDER: ICD-10-CM

## 2022-09-23 DIAGNOSIS — E78.5 HYPERLIPIDEMIA, UNSPECIFIED: ICD-10-CM

## 2022-09-23 DIAGNOSIS — D50.0 IRON DEFICIENCY ANEMIA SECONDARY TO BLOOD LOSS (CHRONIC): ICD-10-CM

## 2022-09-23 DIAGNOSIS — C18.2 MALIGNANT NEOPLASM OF ASCENDING COLON: ICD-10-CM

## 2022-09-23 DIAGNOSIS — F41.9 ANXIETY DISORDER, UNSPECIFIED: ICD-10-CM

## 2022-09-23 DIAGNOSIS — M17.9 OSTEOARTHRITIS OF KNEE, UNSPECIFIED: ICD-10-CM

## 2022-09-23 DIAGNOSIS — Z92.21 PERSONAL HISTORY OF ANTINEOPLASTIC CHEMOTHERAPY: ICD-10-CM

## 2022-09-23 DIAGNOSIS — K64.0 FIRST DEGREE HEMORRHOIDS: ICD-10-CM

## 2022-09-23 DIAGNOSIS — I35.1 NONRHEUMATIC AORTIC (VALVE) INSUFFICIENCY: ICD-10-CM

## 2022-09-28 ENCOUNTER — APPOINTMENT (OUTPATIENT)
Age: 87
End: 2022-09-28

## 2022-09-28 ENCOUNTER — APPOINTMENT (OUTPATIENT)
Dept: BARIATRICS | Facility: CLINIC | Age: 87
End: 2022-09-28

## 2022-09-28 VITALS
HEIGHT: 62 IN | WEIGHT: 126.5 LBS | HEART RATE: 80 BPM | SYSTOLIC BLOOD PRESSURE: 197 MMHG | DIASTOLIC BLOOD PRESSURE: 64 MMHG | BODY MASS INDEX: 23.28 KG/M2 | OXYGEN SATURATION: 97 % | TEMPERATURE: 97.6 F

## 2022-10-01 NOTE — PHYSICAL EXAM
[de-identified] : NCAT, NAD [de-identified] : Soft, non-tender, non-distended\par Incisions: CDI

## 2022-10-01 NOTE — HISTORY OF PRESENT ILLNESS
[de-identified] : Patient is an 87 y/o woman with a h/o anemia secondary to colon adenocarcinoma\par She underwent laparsocopic right colecotmy on 9/6/22\par \par Path - pT4a, pN0 [de-identified] : Tolerating PO\par No blood in BM\par Pain at extraction site is improving

## 2022-10-20 ENCOUNTER — APPOINTMENT (OUTPATIENT)
Dept: CARDIOLOGY | Facility: CLINIC | Age: 87
End: 2022-10-20

## 2023-01-11 ENCOUNTER — APPOINTMENT (OUTPATIENT)
Dept: CARDIOLOGY | Facility: CLINIC | Age: 88
End: 2023-01-11
Payer: MEDICARE

## 2023-01-11 ENCOUNTER — NON-APPOINTMENT (OUTPATIENT)
Age: 88
End: 2023-01-11

## 2023-01-11 VITALS — SYSTOLIC BLOOD PRESSURE: 150 MMHG | DIASTOLIC BLOOD PRESSURE: 82 MMHG

## 2023-01-11 VITALS
DIASTOLIC BLOOD PRESSURE: 80 MMHG | SYSTOLIC BLOOD PRESSURE: 150 MMHG | HEART RATE: 69 BPM | BODY MASS INDEX: 23.92 KG/M2 | OXYGEN SATURATION: 98 % | WEIGHT: 130 LBS | HEIGHT: 62 IN

## 2023-01-11 PROCEDURE — 99214 OFFICE O/P EST MOD 30 MIN: CPT

## 2023-01-11 PROCEDURE — 93000 ELECTROCARDIOGRAM COMPLETE: CPT

## 2023-01-11 PROCEDURE — 99204 OFFICE O/P NEW MOD 45 MIN: CPT

## 2023-01-11 RX ORDER — SIMVASTATIN 20 MG/1
20 TABLET, FILM COATED ORAL
Refills: 0 | Status: ACTIVE | COMMUNITY

## 2023-01-12 NOTE — DISCUSSION/SUMMARY
[FreeTextEntry1] : 87-year-old female with elevated blood pressures despite treatment for her hypertension.  May be significant whitecoat effect but would appreciate further information from home testing.  Patient advised to purchase automated blood pressure machine and self monitor to review at next visit.  She has evidence of mitral gravitation on cardiac auscultation and it has been several years since this valve has been evaluated; patient was referred for transthoracic echocardiogram.\par \par Patient advised to continue being active.  Monitor diet suggesting moderation and salt intake and limiting calories.\par Will reevaluate issue of better antihypertensives at next visit and hopefully with measurement of home blood pressures. [EKG obtained to assist in diagnosis and management of assessed problem(s)] : EKG obtained to assist in diagnosis and management of assessed problem(s)

## 2023-01-12 NOTE — HISTORY OF PRESENT ILLNESS
[FreeTextEntry1] : 87 year old female here for evaluation, somewhat limited historian.\par Seen by another cardiologist several years ago for heart murmur, she was found to have moderate MR with normal LVEF.  Hospitalized at [facility] in September, 2022 with invasive adenocarcinoma and a right colonic mass and underwent right hemicolectomy which was uncomplicated.\par \par She states that she has had HTn for several years, but has been fairly reticent to take medications until recently. She appears quite anxious, does not measure her BP's at home. On Metoprolol for several months. Denies any CAD, DM. Non-drinker, non smoker.  No recent history of chest discomfort, dyspnea on exertion or claudication-like pains.\par \par Retired dressmaker, originally from [place], nondrinker, non-smoker.\par \par

## 2023-01-31 ENCOUNTER — APPOINTMENT (OUTPATIENT)
Dept: CARDIOLOGY | Facility: CLINIC | Age: 88
End: 2023-01-31
Payer: MEDICARE

## 2023-01-31 PROCEDURE — 93306 TTE W/DOPPLER COMPLETE: CPT

## 2023-03-06 NOTE — PATIENT PROFILE ADULT - NSPROPTRIGHTSUPPORTPERSON_GEN_A_NUR
She didn't get her refill for feb. Could you give her the march refill now. Kenneth Noble also the RX needs to go to [facility]. In the last note. declines

## 2023-03-28 ENCOUNTER — APPOINTMENT (OUTPATIENT)
Dept: CT IMAGING | Facility: HOSPITAL | Age: 88
End: 2023-03-28

## 2023-03-28 ENCOUNTER — OUTPATIENT (OUTPATIENT)
Dept: OUTPATIENT SERVICES | Facility: HOSPITAL | Age: 88
LOS: 1 days | Discharge: HOME | End: 2023-03-28
Payer: MEDICARE

## 2023-03-28 DIAGNOSIS — R19.00 INTRA-ABDOMINAL AND PELVIC SWELLING, MASS AND LUMP, UNSPECIFIED SITE: ICD-10-CM

## 2023-03-28 PROCEDURE — 74177 CT ABD & PELVIS W/CONTRAST: CPT | Mod: 26

## 2023-05-11 ENCOUNTER — APPOINTMENT (OUTPATIENT)
Dept: CARDIOLOGY | Facility: CLINIC | Age: 88
End: 2023-05-11
Payer: MEDICARE

## 2023-05-11 VITALS
HEART RATE: 66 BPM | OXYGEN SATURATION: 97 % | BODY MASS INDEX: 24.48 KG/M2 | WEIGHT: 133 LBS | DIASTOLIC BLOOD PRESSURE: 76 MMHG | HEIGHT: 62 IN | SYSTOLIC BLOOD PRESSURE: 140 MMHG

## 2023-05-11 DIAGNOSIS — R01.1 CARDIAC MURMUR, UNSPECIFIED: ICD-10-CM

## 2023-05-11 PROCEDURE — 99213 OFFICE O/P EST LOW 20 MIN: CPT

## 2023-05-11 PROCEDURE — 93000 ELECTROCARDIOGRAM COMPLETE: CPT

## 2023-06-21 ENCOUNTER — NON-APPOINTMENT (OUTPATIENT)
Age: 88
End: 2023-06-21

## 2023-06-28 PROBLEM — R01.1 HEART MURMUR, SYSTOLIC: Status: ACTIVE | Noted: 2023-01-11

## 2023-06-29 NOTE — HISTORY OF PRESENT ILLNESS
[FreeTextEntry1] : 87 year old female here for followup for HTN.\par Seen by another cardiologist several years ago for heart murmur, she was found to have moderate MR with normal LVEF.  Hospitalized at [facility] in September, 2022 with invasive adenocarcinoma and a right colonic mass and underwent right hemicolectomy which was uncomplicated.\par \par Not measuring her BP's at home. On Metoprolol for several months. Denies any CAD, DM. Non-drinker, non smoker.  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

## 2023-06-29 NOTE — CARDIOLOGY SUMMARY
[de-identified] : 5/11/23,NSR\par 1/11/23, Sinus rhythm with atrial premature contractions. [de-identified] : 1/31/23, mild MR,TR, EF 69%

## 2023-06-29 NOTE — DISCUSSION/SUMMARY
[EKG obtained to assist in diagnosis and management of assessed problem(s)] : EKG obtained to assist in diagnosis and management of assessed problem(s) [FreeTextEntry1] : 87-year-old female with elevated blood pressures despite treatment for her hypertension.  May be significant whitecoat effect.  Patient advised to purchase automated blood pressure machine and self monitor to review at next visit.  Goal sBP of 140/90 given her age.\par \par Patient advised to continue being active.  Monitor diet suggesting moderation and salt intake and limiting calories.\par Will reevaluate issue of better antihypertensives at next visit and hopefully with measurement of home blood pressures.\par No recent labs, will request from PCP.

## 2023-07-06 ENCOUNTER — RX RENEWAL (OUTPATIENT)
Age: 88
End: 2023-07-06

## 2023-09-06 NOTE — ED PROVIDER NOTE - INTERPRETATION
Med managed by Psychiatry-refilled on 09/05
Medication requested: Valproate Sodium (Valproic Acid) 250 MG/5ML Solution    Last office visit date: 8/7/23  Pending appointment: 09/08/2023 with Dr. Luz Kingston  Medication Refill Protocol Failed. Sent to clinician to review.
normal sinus rhythm

## 2023-09-26 ENCOUNTER — APPOINTMENT (OUTPATIENT)
Dept: ORTHOPEDIC SURGERY | Facility: CLINIC | Age: 88
End: 2023-09-26

## 2023-10-01 NOTE — PATIENT PROFILE ADULT - FUNCTIONAL ASSESSMENT - DAILY ACTIVITY 4.
History of a stroke or TIA High blood pressure/History of a stroke or TIA 4 = No assist / stand by assistance

## 2023-10-31 ENCOUNTER — APPOINTMENT (OUTPATIENT)
Dept: ORTHOPEDIC SURGERY | Facility: CLINIC | Age: 88
End: 2023-10-31
Payer: MEDICARE

## 2023-10-31 DIAGNOSIS — M50.90 CERVICAL DISC DISORDER, UNSPECIFIED, UNSPECIFIED CERVICAL REGION: ICD-10-CM

## 2023-10-31 DIAGNOSIS — M47.812 SPONDYLOSIS W/OUT MYELOPATHY OR RADICULOPATHY, CERVICAL REGION: ICD-10-CM

## 2023-10-31 DIAGNOSIS — Z86.79 PERSONAL HISTORY OF OTHER DISEASES OF THE CIRCULATORY SYSTEM: ICD-10-CM

## 2023-10-31 DIAGNOSIS — Z86.39 PERSONAL HISTORY OF OTHER ENDOCRINE, NUTRITIONAL AND METABOLIC DISEASE: ICD-10-CM

## 2023-10-31 PROCEDURE — 99203 OFFICE O/P NEW LOW 30 MIN: CPT | Mod: 25

## 2023-10-31 PROCEDURE — 72040 X-RAY EXAM NECK SPINE 2-3 VW: CPT

## 2023-10-31 PROCEDURE — 20553 NJX 1/MLT TRIGGER POINTS 3/>: CPT

## 2023-11-16 ENCOUNTER — APPOINTMENT (OUTPATIENT)
Dept: CARDIOLOGY | Facility: CLINIC | Age: 88
End: 2023-11-16
Payer: MEDICARE

## 2023-11-16 ENCOUNTER — NON-APPOINTMENT (OUTPATIENT)
Age: 88
End: 2023-11-16

## 2023-11-16 VITALS
WEIGHT: 131 LBS | SYSTOLIC BLOOD PRESSURE: 180 MMHG | BODY MASS INDEX: 24.11 KG/M2 | HEIGHT: 62 IN | DIASTOLIC BLOOD PRESSURE: 90 MMHG | HEART RATE: 72 BPM | OXYGEN SATURATION: 98 %

## 2023-11-16 VITALS — SYSTOLIC BLOOD PRESSURE: 170 MMHG | DIASTOLIC BLOOD PRESSURE: 90 MMHG

## 2023-11-16 DIAGNOSIS — E78.5 HYPERLIPIDEMIA, UNSPECIFIED: ICD-10-CM

## 2023-11-16 DIAGNOSIS — I10 ESSENTIAL (PRIMARY) HYPERTENSION: ICD-10-CM

## 2023-11-16 PROCEDURE — 93000 ELECTROCARDIOGRAM COMPLETE: CPT

## 2023-11-16 PROCEDURE — 99214 OFFICE O/P EST MOD 30 MIN: CPT

## 2023-11-16 RX ORDER — METOPROLOL SUCCINATE 100 MG/1
100 TABLET, EXTENDED RELEASE ORAL
Qty: 90 | Refills: 3 | Status: ACTIVE | COMMUNITY
Start: 2023-07-06 | End: 1900-01-01

## 2023-12-16 ENCOUNTER — EMERGENCY (EMERGENCY)
Facility: HOSPITAL | Age: 88
LOS: 0 days | Discharge: HOME | End: 2023-12-17
Attending: STUDENT IN AN ORGANIZED HEALTH CARE EDUCATION/TRAINING PROGRAM
Payer: MEDICARE

## 2023-12-16 VITALS
RESPIRATION RATE: 17 BRPM | HEIGHT: 62 IN | WEIGHT: 134.92 LBS | HEART RATE: 75 BPM | SYSTOLIC BLOOD PRESSURE: 139 MMHG | OXYGEN SATURATION: 98 % | TEMPERATURE: 98 F | DIASTOLIC BLOOD PRESSURE: 70 MMHG

## 2023-12-16 DIAGNOSIS — R06.02 SHORTNESS OF BREATH: ICD-10-CM

## 2023-12-16 DIAGNOSIS — E78.5 HYPERLIPIDEMIA, UNSPECIFIED: ICD-10-CM

## 2023-12-16 DIAGNOSIS — M54.9 DORSALGIA, UNSPECIFIED: ICD-10-CM

## 2023-12-16 DIAGNOSIS — I10 ESSENTIAL (PRIMARY) HYPERTENSION: ICD-10-CM

## 2023-12-16 DIAGNOSIS — R07.9 CHEST PAIN, UNSPECIFIED: ICD-10-CM

## 2023-12-16 LAB
ALBUMIN SERPL ELPH-MCNC: 3.1 G/DL — LOW (ref 3.3–5)
ALBUMIN SERPL ELPH-MCNC: 3.1 G/DL — LOW (ref 3.3–5)
ALP SERPL-CCNC: 61 U/L — SIGNIFICANT CHANGE UP (ref 40–120)
ALP SERPL-CCNC: 61 U/L — SIGNIFICANT CHANGE UP (ref 40–120)
ALT FLD-CCNC: 40 U/L — SIGNIFICANT CHANGE UP (ref 12–78)
ALT FLD-CCNC: 40 U/L — SIGNIFICANT CHANGE UP (ref 12–78)
ANION GAP SERPL CALC-SCNC: 5 MMOL/L — SIGNIFICANT CHANGE UP (ref 5–17)
ANION GAP SERPL CALC-SCNC: 5 MMOL/L — SIGNIFICANT CHANGE UP (ref 5–17)
APTT BLD: 23.1 SEC — LOW (ref 24.5–35.6)
APTT BLD: 23.1 SEC — LOW (ref 24.5–35.6)
AST SERPL-CCNC: 52 U/L — HIGH (ref 15–37)
AST SERPL-CCNC: 52 U/L — HIGH (ref 15–37)
BASOPHILS # BLD AUTO: 0.06 K/UL — SIGNIFICANT CHANGE UP (ref 0–0.2)
BASOPHILS # BLD AUTO: 0.06 K/UL — SIGNIFICANT CHANGE UP (ref 0–0.2)
BASOPHILS NFR BLD AUTO: 1 % — SIGNIFICANT CHANGE UP (ref 0–2)
BASOPHILS NFR BLD AUTO: 1 % — SIGNIFICANT CHANGE UP (ref 0–2)
BILIRUB SERPL-MCNC: 0.5 MG/DL — SIGNIFICANT CHANGE UP (ref 0.2–1.2)
BILIRUB SERPL-MCNC: 0.5 MG/DL — SIGNIFICANT CHANGE UP (ref 0.2–1.2)
BLD GP AB SCN SERPL QL: SIGNIFICANT CHANGE UP
BLD GP AB SCN SERPL QL: SIGNIFICANT CHANGE UP
BUN SERPL-MCNC: 19 MG/DL — SIGNIFICANT CHANGE UP (ref 7–23)
BUN SERPL-MCNC: 19 MG/DL — SIGNIFICANT CHANGE UP (ref 7–23)
CALCIUM SERPL-MCNC: 8.6 MG/DL — SIGNIFICANT CHANGE UP (ref 8.5–10.1)
CALCIUM SERPL-MCNC: 8.6 MG/DL — SIGNIFICANT CHANGE UP (ref 8.5–10.1)
CHLORIDE SERPL-SCNC: 98 MMOL/L — SIGNIFICANT CHANGE UP (ref 96–108)
CHLORIDE SERPL-SCNC: 98 MMOL/L — SIGNIFICANT CHANGE UP (ref 96–108)
CO2 SERPL-SCNC: 31 MMOL/L — SIGNIFICANT CHANGE UP (ref 22–31)
CO2 SERPL-SCNC: 31 MMOL/L — SIGNIFICANT CHANGE UP (ref 22–31)
CREAT SERPL-MCNC: 0.84 MG/DL — SIGNIFICANT CHANGE UP (ref 0.5–1.3)
CREAT SERPL-MCNC: 0.84 MG/DL — SIGNIFICANT CHANGE UP (ref 0.5–1.3)
EGFR: 68 ML/MIN/1.73M2 — SIGNIFICANT CHANGE UP
EGFR: 68 ML/MIN/1.73M2 — SIGNIFICANT CHANGE UP
EOSINOPHIL # BLD AUTO: 0.21 K/UL — SIGNIFICANT CHANGE UP (ref 0–0.5)
EOSINOPHIL # BLD AUTO: 0.21 K/UL — SIGNIFICANT CHANGE UP (ref 0–0.5)
EOSINOPHIL NFR BLD AUTO: 3.5 % — SIGNIFICANT CHANGE UP (ref 0–6)
EOSINOPHIL NFR BLD AUTO: 3.5 % — SIGNIFICANT CHANGE UP (ref 0–6)
GLUCOSE SERPL-MCNC: 148 MG/DL — HIGH (ref 70–99)
GLUCOSE SERPL-MCNC: 148 MG/DL — HIGH (ref 70–99)
HCT VFR BLD CALC: 38.8 % — SIGNIFICANT CHANGE UP (ref 34.5–45)
HCT VFR BLD CALC: 38.8 % — SIGNIFICANT CHANGE UP (ref 34.5–45)
HGB BLD-MCNC: 13 G/DL — SIGNIFICANT CHANGE UP (ref 11.5–15.5)
HGB BLD-MCNC: 13 G/DL — SIGNIFICANT CHANGE UP (ref 11.5–15.5)
IMM GRANULOCYTES NFR BLD AUTO: 0.3 % — SIGNIFICANT CHANGE UP (ref 0–0.9)
IMM GRANULOCYTES NFR BLD AUTO: 0.3 % — SIGNIFICANT CHANGE UP (ref 0–0.9)
INR BLD: 0.88 RATIO — SIGNIFICANT CHANGE UP (ref 0.85–1.18)
INR BLD: 0.88 RATIO — SIGNIFICANT CHANGE UP (ref 0.85–1.18)
LIDOCAIN IGE QN: 48 U/L — SIGNIFICANT CHANGE UP (ref 13–75)
LIDOCAIN IGE QN: 48 U/L — SIGNIFICANT CHANGE UP (ref 13–75)
LYMPHOCYTES # BLD AUTO: 2.13 K/UL — SIGNIFICANT CHANGE UP (ref 1–3.3)
LYMPHOCYTES # BLD AUTO: 2.13 K/UL — SIGNIFICANT CHANGE UP (ref 1–3.3)
LYMPHOCYTES # BLD AUTO: 35.3 % — SIGNIFICANT CHANGE UP (ref 13–44)
LYMPHOCYTES # BLD AUTO: 35.3 % — SIGNIFICANT CHANGE UP (ref 13–44)
MCHC RBC-ENTMCNC: 30.2 PG — SIGNIFICANT CHANGE UP (ref 27–34)
MCHC RBC-ENTMCNC: 30.2 PG — SIGNIFICANT CHANGE UP (ref 27–34)
MCHC RBC-ENTMCNC: 33.5 G/DL — SIGNIFICANT CHANGE UP (ref 32–36)
MCHC RBC-ENTMCNC: 33.5 G/DL — SIGNIFICANT CHANGE UP (ref 32–36)
MCV RBC AUTO: 90 FL — SIGNIFICANT CHANGE UP (ref 80–100)
MCV RBC AUTO: 90 FL — SIGNIFICANT CHANGE UP (ref 80–100)
MONOCYTES # BLD AUTO: 0.47 K/UL — SIGNIFICANT CHANGE UP (ref 0–0.9)
MONOCYTES # BLD AUTO: 0.47 K/UL — SIGNIFICANT CHANGE UP (ref 0–0.9)
MONOCYTES NFR BLD AUTO: 7.8 % — SIGNIFICANT CHANGE UP (ref 2–14)
MONOCYTES NFR BLD AUTO: 7.8 % — SIGNIFICANT CHANGE UP (ref 2–14)
NEUTROPHILS # BLD AUTO: 3.15 K/UL — SIGNIFICANT CHANGE UP (ref 1.8–7.4)
NEUTROPHILS # BLD AUTO: 3.15 K/UL — SIGNIFICANT CHANGE UP (ref 1.8–7.4)
NEUTROPHILS NFR BLD AUTO: 52.1 % — SIGNIFICANT CHANGE UP (ref 43–77)
NEUTROPHILS NFR BLD AUTO: 52.1 % — SIGNIFICANT CHANGE UP (ref 43–77)
NRBC # BLD: 0 /100 WBCS — SIGNIFICANT CHANGE UP (ref 0–0)
NRBC # BLD: 0 /100 WBCS — SIGNIFICANT CHANGE UP (ref 0–0)
NT-PROBNP SERPL-SCNC: 666 PG/ML — HIGH (ref 0–450)
NT-PROBNP SERPL-SCNC: 666 PG/ML — HIGH (ref 0–450)
PLATELET # BLD AUTO: 231 K/UL — SIGNIFICANT CHANGE UP (ref 150–400)
PLATELET # BLD AUTO: 231 K/UL — SIGNIFICANT CHANGE UP (ref 150–400)
POTASSIUM SERPL-MCNC: 4.2 MMOL/L — SIGNIFICANT CHANGE UP (ref 3.5–5.3)
POTASSIUM SERPL-MCNC: 4.2 MMOL/L — SIGNIFICANT CHANGE UP (ref 3.5–5.3)
POTASSIUM SERPL-SCNC: 4.2 MMOL/L — SIGNIFICANT CHANGE UP (ref 3.5–5.3)
POTASSIUM SERPL-SCNC: 4.2 MMOL/L — SIGNIFICANT CHANGE UP (ref 3.5–5.3)
PROT SERPL-MCNC: 7.5 GM/DL — SIGNIFICANT CHANGE UP (ref 6–8.3)
PROT SERPL-MCNC: 7.5 GM/DL — SIGNIFICANT CHANGE UP (ref 6–8.3)
PROTHROM AB SERPL-ACNC: 10.5 SEC — SIGNIFICANT CHANGE UP (ref 9.5–13)
PROTHROM AB SERPL-ACNC: 10.5 SEC — SIGNIFICANT CHANGE UP (ref 9.5–13)
RBC # BLD: 4.31 M/UL — SIGNIFICANT CHANGE UP (ref 3.8–5.2)
RBC # BLD: 4.31 M/UL — SIGNIFICANT CHANGE UP (ref 3.8–5.2)
RBC # FLD: 12.4 % — SIGNIFICANT CHANGE UP (ref 10.3–14.5)
RBC # FLD: 12.4 % — SIGNIFICANT CHANGE UP (ref 10.3–14.5)
SODIUM SERPL-SCNC: 134 MMOL/L — LOW (ref 135–145)
SODIUM SERPL-SCNC: 134 MMOL/L — LOW (ref 135–145)
TROPONIN I, HIGH SENSITIVITY RESULT: 28 NG/L — SIGNIFICANT CHANGE UP
TROPONIN I, HIGH SENSITIVITY RESULT: 28 NG/L — SIGNIFICANT CHANGE UP
WBC # BLD: 6.04 K/UL — SIGNIFICANT CHANGE UP (ref 3.8–10.5)
WBC # BLD: 6.04 K/UL — SIGNIFICANT CHANGE UP (ref 3.8–10.5)
WBC # FLD AUTO: 6.04 K/UL — SIGNIFICANT CHANGE UP (ref 3.8–10.5)
WBC # FLD AUTO: 6.04 K/UL — SIGNIFICANT CHANGE UP (ref 3.8–10.5)

## 2023-12-16 PROCEDURE — 74174 CTA ABD&PLVS W/CONTRAST: CPT | Mod: 26,MA

## 2023-12-16 PROCEDURE — 71275 CT ANGIOGRAPHY CHEST: CPT | Mod: 26,MA

## 2023-12-16 PROCEDURE — 93010 ELECTROCARDIOGRAM REPORT: CPT

## 2023-12-16 PROCEDURE — 99285 EMERGENCY DEPT VISIT HI MDM: CPT

## 2023-12-16 RX ORDER — MORPHINE SULFATE 50 MG/1
2 CAPSULE, EXTENDED RELEASE ORAL ONCE
Refills: 0 | Status: DISCONTINUED | OUTPATIENT
Start: 2023-12-16 | End: 2023-12-16

## 2023-12-16 RX ORDER — ONDANSETRON 8 MG/1
4 TABLET, FILM COATED ORAL ONCE
Refills: 0 | Status: COMPLETED | OUTPATIENT
Start: 2023-12-16 | End: 2023-12-16

## 2023-12-16 RX ADMIN — ONDANSETRON 4 MILLIGRAM(S): 8 TABLET, FILM COATED ORAL at 20:28

## 2023-12-16 RX ADMIN — MORPHINE SULFATE 2 MILLIGRAM(S): 50 CAPSULE, EXTENDED RELEASE ORAL at 20:28

## 2023-12-16 NOTE — ED ADULT NURSE NOTE - NSFALLHARMRISKINTERV_ED_ALL_ED
Assistance OOB with selected safe patient handling equipment if applicable/Assistance with ambulation/Communicate risk of Fall with Harm to all staff, patient, and family/Provide visual cue: red socks, yellow wristband, yellow gown, etc/Reinforce activity limits and safety measures with patient and family/Bed in lowest position, wheels locked, appropriate side rails in place/Call bell, personal items and telephone in reach/Instruct patient to call for assistance before getting out of bed/chair/stretcher/Non-slip footwear applied when patient is off stretcher/[place] to call system/Physically safe environment - no spills, clutter or unnecessary equipment/Purposeful Proactive Rounding/Room/bathroom lighting operational, light cord in reach Assistance OOB with selected safe patient handling equipment if applicable/Assistance with ambulation/Communicate risk of Fall with Harm to all staff, patient, and family/Provide visual cue: red socks, yellow wristband, yellow gown, etc/Reinforce activity limits and safety measures with patient and family/Bed in lowest position, wheels locked, appropriate side rails in place/Call bell, personal items and telephone in reach/Instruct patient to call for assistance before getting out of bed/chair/stretcher/Non-slip footwear applied when patient is off stretcher/[place] to call system/Physically safe environment - no spills, clutter or unnecessary equipment/Purposeful Proactive Rounding/Room/bathroom lighting operational, light cord in reach

## 2023-12-16 NOTE — ED ADULT NURSE NOTE - OBJECTIVE STATEMENT
Pt A&Ox4. Pt c/o chest pain and back pain and sob and nausea acute onset  30 minutes ago, 160 mg asa and nitro given by EMS. Pt placed on cardiac monitor at this time. Pt denies dizziness, lightheadedness, fever or chills. Pt states "it feels like something heavy is sitting on my chest, I have chest pain and some SOB". PMH HLD, HTN. NKDA

## 2023-12-17 VITALS
TEMPERATURE: 98 F | DIASTOLIC BLOOD PRESSURE: 85 MMHG | SYSTOLIC BLOOD PRESSURE: 141 MMHG | HEART RATE: 87 BPM | OXYGEN SATURATION: 96 % | RESPIRATION RATE: 16 BRPM

## 2023-12-17 LAB
TROPONIN I, HIGH SENSITIVITY RESULT: 32.5 NG/L — SIGNIFICANT CHANGE UP
TROPONIN I, HIGH SENSITIVITY RESULT: 32.5 NG/L — SIGNIFICANT CHANGE UP
TROPONIN I, HIGH SENSITIVITY RESULT: 35.2 NG/L — SIGNIFICANT CHANGE UP
TROPONIN I, HIGH SENSITIVITY RESULT: 35.2 NG/L — SIGNIFICANT CHANGE UP

## 2023-12-17 PROCEDURE — 93010 ELECTROCARDIOGRAM REPORT: CPT

## 2023-12-17 RX ADMIN — MORPHINE SULFATE 2 MILLIGRAM(S): 50 CAPSULE, EXTENDED RELEASE ORAL at 01:39

## 2023-12-17 NOTE — ED PROVIDER NOTE - NS_HEARTSCLOWRISKCHEST_ED_A_ED
Medical Week 1 Survey      Responses   [facility] patient discharged from?  [facility]   Does the patient have one of the following disease processes/diagnoses(primary or secondary)?  Other   Week 1 attempt successful?  No   Unsuccessful attempts  Attempt 3          Sandra Rodriguez LPN   Launch Heart Score Calculator…

## 2023-12-17 NOTE — ED PROVIDER NOTE - PROVIDER TOKENS
PROVIDER:[TOKEN:[[id number]],FOLLOWUP:[1-3 Days]] PROVIDER:[TOKEN:[[id number]],FOLLOWUP:[1-3 Days]]

## 2023-12-17 NOTE — ED PROVIDER NOTE - CLINICAL SUMMARY MEDICAL DECISION MAKING FREE TEXT BOX
87 y/o F with PMH HLD presenting to the ED c/o CP.  Vitals stable.  Will obtain dissection study given radiation of pain to the back.  Will perform ACS work up with delta trop.    CTA WNL  Delta trop negative x 2  Patient with no active chest pain  EKG is NSR without ischemia x 2  Patient amenable for d/c with cardiology f/u  Heart score 4

## 2023-12-17 NOTE — ED ADULT NURSE REASSESSMENT NOTE - NS ED NURSE REASSESS COMMENT FT1
SW contacted to set up transportation, pt awaiting ambulance p/u, pt A/Ox4, steady gait noted, pt denies headache, dizziness, lightheadedness, chest pain, pt reports ambulating independently w/o any assistive devices, pt states she lives alone and has neighbors that check-ins with her, pt states she still drives and feels safe going home.
Received report from Isabella LAUGHLIN, pt A/Ox4, resting comfortably in bed, even and unlabored respirations noted, no signs or symptoms of acute distress noted.

## 2023-12-17 NOTE — ED PROVIDER NOTE - PHYSICAL EXAMINATION
GENERAL: Awake, alert, NAD  HEENT: NC/AT, moist mucous membranes  LUNGS: CTAB, no wheezes or crackles   CARDIAC: RRR, no m/r/g  ABDOMEN: Soft, non tender, non distended, no rebound, no guarding  BACK: No midline spinal tenderness, no CVA tenderness  EXT: No edema, no calf tenderness, 2+ DP pulses bilaterally, no deformities.  NEURO: A&Ox3. Moving all extremities.  SKIN: Warm and dry. No rash.  PSYCH: Normal affect.

## 2023-12-17 NOTE — ED PROVIDER NOTE - OBJECTIVE STATEMENT
89 y/o F with PMH HLD presenting to the ED c/o CP.  Patient states the pain began tonight and radiated toward her back. Reports feels like a sharp pain.  She denies N/V, fever, chills. Reports some dyspnea.  Upon EMS arrival, patient was found to be hypertensive.  She was given NTG by EMS with resolution of BP. 87 y/o F with PMH HLD presenting to the ED c/o CP.  Patient states the pain began tonight and radiated toward her back. Reports feels like a sharp pain.  She denies N/V, fever, chills. Reports some dyspnea.  Upon EMS arrival, patient was found to be hypertensive.  She was given NTG by EMS with resolution of BP.

## 2023-12-17 NOTE — ED PROVIDER NOTE - NS_HEARTSCRISKFACTOR_ED_A_ED
You have tested positive for COVID-19 today.      ISOLATION  If you tested positive and do not have symptoms, you must isolate for 5 days starting on the day of the positive test. I    If you tested positive and have symptoms, you must isolate for 5 days starting on the day of the first symptoms,  not the day of the positive test.     Both the CDC and the [facility] emphasize that you do not test out of isolation.     After 5 days, if your symptoms have improved and you have not had fever on day 5, you can return to the community on day 6- NO TESTING REQUIRED!      In fact, we do not retest if you were positive in the last 90 days.    After your 5 days of isolation are completed, the CDC recommends strict mask use for the first 5 days that you come out of isolation.    Patient Instructions   PLEASE READ YOUR DISCHARGE INSTRUCTIONS ENTIRELY AS IT CONTAINS IMPORTANT INFORMATION.     Please drink plenty of fluids.     Please get plenty of rest.     Please return here or go to the Emergency Department for any concerns or worsening of condition.     Please take an over the counter antihistamine medication (allegra/Claritin/Zyrtec) of your choice as directed.     Try an over the counter decongestant like Mucinex D or Sudafed. You buy this behind the pharmacy counter     If you do have Hypertension or palpitations, it is safe to take Coricidin HBP for relief of sinus symptoms.     If not allergic, please take over the counter Tylenol (Acetaminophen) and/or Motrin (Ibuprofen) as directed for control of pain and/or fever.  Please follow up with your primary care doctor or specialist as needed.     Sore throat recommendations: Warm fluids, warm salt water gargles, throat lozenges, tea, honey, soup, rest, hydration.     Use over the counter flonase: one spray each nostril twice daily OR two sprays each nostril once daily.      If you  smoke, please stop smoking.     Please return or see your primary care doctor if you develop new or  worsening symptoms.      Please arrange follow up with your primary medical clinic as soon as possible. You must understand that you've received an Urgent Care treatment only and that you may be released before all of your medical problems are known or treated. You, the patient, will arrange for follow up as instructed. If your symptoms worsen or fail to improve you should go to the Emergency Room.     1 to 2 Risk Factors

## 2023-12-17 NOTE — ED PROVIDER NOTE - NS ED ROS FT
CONST: no fevers, no chills  EYES: no pain, no vision changes  ENT: no sore throat, no ear pain, no change in hearing  CV: + chest pain, no leg swelling  RESP: + shortness of breath, no cough  ABD: no abdominal pain, no nausea, no vomiting, no diarrhea  : no dysuria, no flank pain, no hematuria  MSK: + back pain, no extremity pain  NEURO: no headache or additional neurologic complaints  HEME: no easy bleeding  SKIN:  no rash

## 2023-12-17 NOTE — ED PROVIDER NOTE - CARE PROVIDER_API CALL
Alf Mejia  Cardiovascular Disease  [address] [place] [address]  Phone: [phone]  Fax: [phone]  Follow Up Time: 1-3 Days   Alf Mejia  Cardiovascular Disease  [address] [place] [address]  Phone: [phone]  Fax: [phone]  Follow Up Time: 1-3 Days

## 2023-12-17 NOTE — ED PROVIDER NOTE - PATIENT PORTAL LINK FT
You can access the FollowMyHealth Patient Portal offered by [facility] by registering at the following website: [email or web address]. By joining [employer]’s FollowMyHealth portal, you will also be able to view your health information using other applications (apps) compatible with our system. You can access the FollowMyHealth Patient Portal offered by [facility] by registering at the following website: [email or web address]. By joining [employer]’s FollowMyHealth portal, you will also be able to view your health information using other applications (apps) compatible with our system.

## 2024-07-24 ENCOUNTER — NON-APPOINTMENT (OUTPATIENT)
Age: 89
End: 2024-07-24

## 2024-07-24 ENCOUNTER — APPOINTMENT (OUTPATIENT)
Dept: CARDIOLOGY | Facility: CLINIC | Age: 89
End: 2024-07-24

## 2024-07-24 VITALS
BODY MASS INDEX: 25.4 KG/M2 | DIASTOLIC BLOOD PRESSURE: 70 MMHG | SYSTOLIC BLOOD PRESSURE: 142 MMHG | WEIGHT: 138 LBS | OXYGEN SATURATION: 97 % | HEART RATE: 65 BPM | HEIGHT: 62 IN

## 2024-07-24 PROCEDURE — 99214 OFFICE O/P EST MOD 30 MIN: CPT

## 2024-07-24 PROCEDURE — 93000 ELECTROCARDIOGRAM COMPLETE: CPT

## 2024-07-24 PROCEDURE — G2211 COMPLEX E/M VISIT ADD ON: CPT

## 2024-07-24 RX ORDER — VALSARTAN 80 MG/1
80 TABLET, COATED ORAL AT BEDTIME
Qty: 90 | Refills: 3 | Status: ACTIVE | COMMUNITY
Start: 2024-07-24 | End: 1900-01-01

## 2024-09-03 ENCOUNTER — RX RENEWAL (OUTPATIENT)
Age: 89
End: 2024-09-03

## 2024-10-17 ENCOUNTER — NON-APPOINTMENT (OUTPATIENT)
Age: 89
End: 2024-10-17

## 2024-10-17 ENCOUNTER — APPOINTMENT (OUTPATIENT)
Dept: CARDIOLOGY | Facility: CLINIC | Age: 89
End: 2024-10-17
Payer: MEDICARE

## 2024-10-17 VITALS
WEIGHT: 134 LBS | SYSTOLIC BLOOD PRESSURE: 146 MMHG | OXYGEN SATURATION: 98 % | HEART RATE: 65 BPM | HEIGHT: 62 IN | BODY MASS INDEX: 24.66 KG/M2 | DIASTOLIC BLOOD PRESSURE: 78 MMHG

## 2024-10-17 PROCEDURE — 99214 OFFICE O/P EST MOD 30 MIN: CPT

## 2024-10-17 PROCEDURE — 93000 ELECTROCARDIOGRAM COMPLETE: CPT

## 2024-10-17 PROCEDURE — G2211 COMPLEX E/M VISIT ADD ON: CPT

## 2025-04-16 ENCOUNTER — NON-APPOINTMENT (OUTPATIENT)
Age: 89
End: 2025-04-16

## 2025-04-16 ENCOUNTER — APPOINTMENT (OUTPATIENT)
Dept: CARDIOLOGY | Facility: CLINIC | Age: 89
End: 2025-04-16
Payer: MEDICARE

## 2025-04-16 VITALS
HEIGHT: 62 IN | WEIGHT: 136 LBS | HEART RATE: 58 BPM | DIASTOLIC BLOOD PRESSURE: 80 MMHG | BODY MASS INDEX: 25.03 KG/M2 | OXYGEN SATURATION: 96 % | SYSTOLIC BLOOD PRESSURE: 128 MMHG

## 2025-04-16 DIAGNOSIS — E78.5 HYPERLIPIDEMIA, UNSPECIFIED: ICD-10-CM

## 2025-04-16 DIAGNOSIS — I10 ESSENTIAL (PRIMARY) HYPERTENSION: ICD-10-CM

## 2025-04-16 PROCEDURE — 99213 OFFICE O/P EST LOW 20 MIN: CPT

## 2025-04-16 PROCEDURE — 93000 ELECTROCARDIOGRAM COMPLETE: CPT

## 2025-04-16 PROCEDURE — G2211 COMPLEX E/M VISIT ADD ON: CPT

## 2025-04-21 LAB
ALBUMIN SERPL ELPH-MCNC: 4.4 G/DL
ALP BLD-CCNC: 68 U/L
ALT SERPL-CCNC: 17 U/L
ANION GAP SERPL CALC-SCNC: 12 MMOL/L
AST SERPL-CCNC: 25 U/L
BILIRUB SERPL-MCNC: 0.3 MG/DL
BUN SERPL-MCNC: 13 MG/DL
CALCIUM SERPL-MCNC: 9.4 MG/DL
CHLORIDE SERPL-SCNC: 96 MMOL/L
CHOLEST SERPL-MCNC: 221 MG/DL
CO2 SERPL-SCNC: 24 MMOL/L
CREAT SERPL-MCNC: 0.78 MG/DL
EGFRCR SERPLBLD CKD-EPI 2021: 73 ML/MIN/1.73M2
GLUCOSE SERPL-MCNC: 85 MG/DL
HCT VFR BLD CALC: 36.5 %
HDLC SERPL-MCNC: 60 MG/DL
HGB BLD-MCNC: 12.7 G/DL
LDLC SERPL-MCNC: 147 MG/DL
MCHC RBC-ENTMCNC: 30 PG
MCHC RBC-ENTMCNC: 34.8 G/DL
MCV RBC AUTO: 86.1 FL
NONHDLC SERPL-MCNC: 160 MG/DL
PLATELET # BLD AUTO: 209 K/UL
POTASSIUM SERPL-SCNC: 4.3 MMOL/L
PROT SERPL-MCNC: 7 G/DL
RBC # BLD: 4.24 M/UL
RBC # FLD: 12.5 %
SODIUM SERPL-SCNC: 132 MMOL/L
TRIGL SERPL-MCNC: 75 MG/DL
TSH SERPL-ACNC: 1.15 UIU/ML
WBC # FLD AUTO: 5.3 K/UL

## 2025-07-10 ENCOUNTER — RX RENEWAL (OUTPATIENT)
Age: 89
End: 2025-07-10

## (undated) DEVICE — DRAPE 1/2 SHEET 40X57"

## (undated) DEVICE — TUBING ERBE CO2 OLYMPUS CONNECTOR

## (undated) DEVICE — DRAPE GENERAL ENDOSCOPY

## (undated) DEVICE — OSTOMY KIT 2-PIECE 4" NS (YELLOW)

## (undated) DEVICE — Device

## (undated) DEVICE — DRAPE MAYO STAND 30"

## (undated) DEVICE — TROCAR COVIDIEN VERSAONE BLADED FIXATION 11MM STANDARD

## (undated) DEVICE — SHEARS COVIDIEN ENDO SHEAR 5MM X 31CM W UNIPOLAR CAUTERY

## (undated) DEVICE — FRA-ESU BOVIE FORCE TRIAD T6D04548DX: Type: DURABLE MEDICAL EQUIPMENT

## (undated) DEVICE — VALVE YELLOW PORT SEAL PLUS 5MM

## (undated) DEVICE — TROCAR COVIDIEN VERSASTEP 5MM STANDARD

## (undated) DEVICE — TROCAR COVIDIEN VERSAONE BLADELESS FIXATION 12MM STANDARD

## (undated) DEVICE — GLV 7.5 PROTEXIS (WHITE)

## (undated) DEVICE — TUBING IRRIGATION DAVOL SYSTEM X STREAM

## (undated) DEVICE — MEDICATION LABELS W MARKER

## (undated) DEVICE — DRAPE TOWEL BLUE 17" X 24"

## (undated) DEVICE — BLADE SCALPEL SAFETYLOCK #15

## (undated) DEVICE — GLV 8 PROTEXIS (WHITE)

## (undated) DEVICE — FORCEP RADIAL JAW 4 W NDL 2.4MM 2.8MM 240CM ORANGE DISP

## (undated) DEVICE — TROCAR APPLIED MEDICAL KII BALLOON BLUNT TIP 12MM X 100MM

## (undated) DEVICE — NDL INJ SCLERO INTERJECT 23G

## (undated) DEVICE — ENDOCATCH 10MM SPECIMEN POUCH

## (undated) DEVICE — STAPLER COVIDIEN ENDO GIA XL HANDLE

## (undated) DEVICE — DRSG STERISTRIPS 0.5 X 4"

## (undated) DEVICE — STAPLER SKIN VISI-STAT 35 WIDE

## (undated) DEVICE — FOLEY TRAY 16FR 5CC LTX UMETER CLOSED

## (undated) DEVICE — SPECIMEN CONTAINER 100ML

## (undated) DEVICE — PACK ADVANCED LAPAROSCOPIC NS

## (undated) DEVICE — DRAPE INSTRUMENT POUCH 6.75" X 11"

## (undated) DEVICE — LIGASURE L-HOOK 44CM

## (undated) DEVICE — KIT ENDO PROCEDURE CUST W/VLV

## (undated) DEVICE — SUT ETHIBOND 2-0 30" SH

## (undated) DEVICE — TUBING STRYKEFLOW II SUCTION / IRRIGATOR

## (undated) DEVICE — TUBING INSUFFLATION LAP FILTER 10FT

## (undated) DEVICE — WARMING BLANKET UPPER ADULT

## (undated) DEVICE — PREP CHLORAPREP HI-LITE ORANGE 26ML

## (undated) DEVICE — DRSG OPSITE 13.75 X 4"

## (undated) DEVICE — VENODYNE/SCD SLEEVE CALF LARGE

## (undated) DEVICE — BLADE SCALPEL SAFETYLOCK #10

## (undated) DEVICE — SNARE OPTIMIZER SOFT POLYPECTOMY SMALL MINI OVAL

## (undated) DEVICE — TUBING HYBRID CO2

## (undated) DEVICE — POSITIONER FOAM EGG CRATE ULNAR 2PCS (PINK)

## (undated) DEVICE — PACK BASIN SPECIAL PROCEDURE

## (undated) DEVICE — DRSG TEGADERM 6"X8"